# Patient Record
Sex: MALE | Race: BLACK OR AFRICAN AMERICAN | Employment: FULL TIME | URBAN - METROPOLITAN AREA
[De-identification: names, ages, dates, MRNs, and addresses within clinical notes are randomized per-mention and may not be internally consistent; named-entity substitution may affect disease eponyms.]

---

## 2018-07-17 ENCOUNTER — OFFICE VISIT (OUTPATIENT)
Dept: INTERNAL MEDICINE CLINIC | Age: 58
End: 2018-07-17

## 2018-07-17 VITALS
HEART RATE: 77 BPM | WEIGHT: 232.1 LBS | SYSTOLIC BLOOD PRESSURE: 166 MMHG | TEMPERATURE: 97.9 F | DIASTOLIC BLOOD PRESSURE: 93 MMHG | BODY MASS INDEX: 34.38 KG/M2 | OXYGEN SATURATION: 96 % | RESPIRATION RATE: 18 BRPM | HEIGHT: 69 IN

## 2018-07-17 DIAGNOSIS — I10 ESSENTIAL HYPERTENSION: Primary | ICD-10-CM

## 2018-07-17 DIAGNOSIS — N52.9 ERECTILE DYSFUNCTION, UNSPECIFIED ERECTILE DYSFUNCTION TYPE: ICD-10-CM

## 2018-07-17 RX ORDER — ASPIRIN 81 MG/1
TABLET ORAL DAILY
COMMUNITY

## 2018-07-17 RX ORDER — HYDROCHLOROTHIAZIDE 25 MG/1
TABLET ORAL
Refills: 99 | COMMUNITY
Start: 2018-06-23 | End: 2018-07-17 | Stop reason: DRUGHIGH

## 2018-07-17 RX ORDER — SILDENAFIL 100 MG/1
100 TABLET, FILM COATED ORAL AS NEEDED
Qty: 10 TAB | Refills: 11 | Status: SHIPPED | OUTPATIENT
Start: 2018-07-17 | End: 2018-12-31

## 2018-07-17 RX ORDER — LISINOPRIL 10 MG/1
TABLET ORAL
Refills: 99 | COMMUNITY
Start: 2018-06-23 | End: 2018-07-17 | Stop reason: CLARIF

## 2018-07-17 RX ORDER — LISINOPRIL AND HYDROCHLOROTHIAZIDE 12.5; 2 MG/1; MG/1
1 TABLET ORAL DAILY
Qty: 90 TAB | Refills: 11 | Status: SHIPPED | OUTPATIENT
Start: 2018-07-17 | End: 2019-07-05 | Stop reason: SDUPTHER

## 2018-07-17 RX ORDER — AMLODIPINE BESYLATE 10 MG/1
TABLET ORAL
Refills: 99 | COMMUNITY
Start: 2018-06-23 | End: 2018-09-13 | Stop reason: SDUPTHER

## 2018-07-17 NOTE — MR AVS SNAPSHOT
[address] [place] [address] [facility] [address] [phone] Patient: Reji Daly MRN: [id number] OPY:63/32/3545 Visit Information Date & Time Provider Department Dept. Phone Encounter #  
 7/17/2018 10:00 AM Juan Chung [facility] [id number] Follow-up Instructions Return in about 2 weeks (around 7/31/2018) for bp check. Follow-up and Disposition History Upcoming Health Maintenance Date Due Hepatitis C Screening 1960 DTaP/Tdap/Td series (1 - Tdap) 11/16/1979 FOBT Q 1 YEAR AGE 50-75 11/16/2008 Influenza Age 5 to Adult 8/1/2018 Allergies as of 7/17/2018  Review Complete On: 7/17/2018 By: Millie Solis MD  
 Not on File Current Immunizations  Never Reviewed No immunizations on file. Not reviewed this visit You Were Diagnosed With   
  
 Codes Comments Essential hypertension    -  Primary ICD-10-CM: I10 
ICD-9-CM: 401.9 Erectile dysfunction, unspecified erectile dysfunction type     ICD-10-CM: N52.9 ICD-9-CM: 607.84 Vitals BP Pulse Temp Resp Height(growth percentile) Weight(growth percentile) (!) 166/93 77 97.9 °F (36.6 °C) (Oral) 18 5' 9\" (1.753 m) 232 lb 1.6 oz (105.3 kg) SpO2 BMI Smoking Status 96% 34.28 kg/m2 Never Smoker Vitals History BMI and BSA Data Body Mass Index Body Surface Area  
 34.28 kg/m 2 2.26 m 2 Preferred Pharmacy Pharmacy Name Phone [id number] [place], [place] - [address] [place] [address] AT [address] [place] [address] [phone] Your Updated Medication List  
  
   
This list is accurate as of 7/17/18 11:58 AM.  Always use your most recent med list. amLODIPine 10 mg tablet Commonly known as:  Austen Ramirez TAKE 1 TABLET BY MOUTH EVERY DAY  
  
 aspirin delayed-release 81 mg tablet Take  by mouth daily. lisinopril-hydroCHLOROthiazide 20-12.5 mg per tablet Commonly known as:  L[place]ton Decent Take 1 Tab by mouth daily. sildenafil citrate 100 mg tablet Commonly known as:  VIAGRA Take 1 Tab by mouth as needed. Prescriptions Sent to Pharmacy Refills  
 lisinopril-hydroCHLOROthiazide (PRINZIDE, ZESTORETIC) 20-12.5 mg per tablet 11 Sig: Take 1 Tab by mouth daily. Class: Normal  
 Pharmacy: [facility]/pharmacy #[id number] AT [address] [place] [address] Ph #: [phone] Route: Oral  
 sildenafil citrate (VIAGRA) 100 mg tablet 11 Sig: Take 1 Tab by mouth as needed. Class: Normal  
 Pharmacy: [facility]/pharmacy #[id number] AT [address] [place] [address] Ph #: [phone] Route: Oral  
  
We Performed the Following AMB POC EKG ROUTINE W/ 12 LEADS, INTER & REP [94421 CPT(R)] CBC WITH AUTOMATED DIFF [69993 CPT(R)] COLLECTION VENOUS BLOOD,VENIPUNCTURE [id number] CPT(R)] HEMOGLOBIN A1C WITH EAG [37027 CPT(R)] LIPID PANEL [90297 CPT(R)] METABOLIC PANEL, COMPREHENSIVE [15421 CPT(R)] PSA, DIAGNOSTIC (PROSTATE SPECIFIC AG) O040873 CPT(R)] URINALYSIS W/ RFLX MICROSCOPIC [86581 CPT(R)] Follow-up Instructions Return in about 2 weeks (around 7/31/2018) for bp check. Patient Instructions Body Mass Index: Care Instructions Your Care Instructions Body mass index (BMI) can help you see if your weight is raising your risk for health problems. It uses a formula to compare how much you weigh with how tall you are. · A BMI lower than 18.5 is considered underweight. · A BMI between 18.5 and 24.9 is considered healthy. · A BMI between 25 and 29.9 is considered overweight. A BMI of 30 or higher is considered obese. If your BMI is in the normal range, it means that you have a lower risk for weight-related health problems.  If your BMI is in the overweight or obese range, you may be at increased risk for weight-related health problems, such as high blood pressure, heart disease, stroke, arthritis or joint pain, and diabetes. If your BMI is in the underweight range, you may be at increased risk for health problems such as fatigue, lower protection (immunity) against illness, muscle loss, bone loss, hair loss, and hormone problems. BMI is just one measure of your risk for weight-related health problems. You may be at higher risk for health problems if you are not active, you eat an unhealthy diet, or you drink too much alcohol or use tobacco products. Follow-up care is a key part of your treatment and safety. Be sure to make and go to all appointments, and call your doctor if you are having problems. It's also a good idea to know your test results and keep a list of the medicines you take. How can you care for yourself at home? · Practice healthy eating habits. This includes eating plenty of fruits, vegetables, whole grains, lean protein, and low-fat dairy. · If your doctor recommends it, get more exercise. Walking is a good choice. Bit by bit, increase the amount you walk every day. Try for at least 30 minutes on most days of the week. · Do not smoke. Smoking can increase your risk for health problems. If you need help quitting, talk to your doctor about stop-smoking programs and medicines. These can increase your chances of quitting for good. · Limit alcohol to 2 drinks a day for men and 1 drink a day for women. Too much alcohol can cause health problems. If you have a BMI higher than 25 · Your doctor may do other tests to check your risk for weight-related health problems. This may include measuring the distance around your waist. A waist measurement of more than 40 inches in men or 35 inches in women can increase the risk of weight-related health problems.  
· Talk with your doctor about steps you can take to stay healthy or improve your health. You may need to make lifestyle changes to lose weight and stay healthy, such as changing your diet and getting regular exercise. If you have a BMI lower than 18.5 · Your doctor may do other tests to check your risk for health problems. · Talk with your doctor about steps you can take to stay healthy or improve your health. You may need to make lifestyle changes to gain or maintain weight and stay healthy, such as getting more healthy foods in your diet and doing exercises to build muscle. Where can you learn more? Go to http://sandip-veronica.info/. Enter S176 in the search box to learn more about \"Body Mass Index: Care Instructions. \" Current as of: October 13, 2016 Content Version: 11.4 © 6700-7750 [employer]. Care instructions adapted under license by [employer] (which disclaims liability or warranty for this information). If you have questions about a medical condition or this instruction, always ask your healthcare professional. [address] any warranty or liability for your use of this information. Introducing \[facility] [place] [facility]\"" & HEALTH SERVICES! Romayne Duster introduces [employer] patient portal. Now you can access parts of your medical record, email your doctor's office, and request medication refills online. 1. In your internet browser, go to [email or web address] 2. Click on the First Time User? Click Here link in the Sign In box. You will see the New Member Sign Up page. 3. Enter your [employer] Access Code exactly as it appears below. You will not need to use this code after youve completed the sign-up process. If you do not sign up before the expiration date, you must request a new code. · [employer] Access Code: [email or web address] Expires: 10/15/2018 10:28 AM 
 
4. Enter the last four digits of your Social Security Number (xxxx) and Date of Birth (mm/dd/yyyy) as indicated and click Submit.  You will be taken to the next sign-up page. 5. Create a [employer] ID. This will be your [employer] login ID and cannot be changed, so think of one that is secure and easy to remember. 6. Create a [employer] password. You can change your password at any time. 7. Enter your Password Reset Question and Answer. This can be used at a later time if you forget your password. 8. Enter your e-mail address. You will receive e-mail notification when new information is available in [address]. 9. Click Sign Up. You can now view and download portions of your medical record. 10. Click the Download Summary menu link to download a portable copy of your medical information. If you have questions, please visit the Frequently Asked Questions section of the [employer] website. Remember, [employer] is NOT to be used for urgent needs. For medical emergencies, dial 911. Now available from your iPhone and Android! Please provide this summary of care documentation to your next provider. Your primary care clinician is listed as Amy Tejeda. If you have any questions after today's visit, please call [phone].

## 2018-07-17 NOTE — PATIENT INSTRUCTIONS
Body Mass Index: Care Instructions Your Care Instructions Body mass index (BMI) can help you see if your weight is raising your risk for health problems. It uses a formula to compare how much you weigh with how tall you are. · A BMI lower than 18.5 is considered underweight. · A BMI between 18.5 and 24.9 is considered healthy. · A BMI between 25 and 29.9 is considered overweight. A BMI of 30 or higher is considered obese. If your BMI is in the normal range, it means that you have a lower risk for weight-related health problems. If your BMI is in the overweight or obese range, you may be at increased risk for weight-related health problems, such as high blood pressure, heart disease, stroke, arthritis or joint pain, and diabetes. If your BMI is in the underweight range, you may be at increased risk for health problems such as fatigue, lower protection (immunity) against illness, muscle loss, bone loss, hair loss, and hormone problems. BMI is just one measure of your risk for weight-related health problems. You may be at higher risk for health problems if you are not active, you eat an unhealthy diet, or you drink too much alcohol or use tobacco products. Follow-up care is a key part of your treatment and safety. Be sure to make and go to all appointments, and call your doctor if you are having problems. It's also a good idea to know your test results and keep a list of the medicines you take. How can you care for yourself at home? · Practice healthy eating habits. This includes eating plenty of fruits, vegetables, whole grains, lean protein, and low-fat dairy. · If your doctor recommends it, get more exercise. Walking is a good choice. Bit by bit, increase the amount you walk every day. Try for at least 30 minutes on most days of the week. · Do not smoke. Smoking can increase your risk for health problems. If you need help quitting, talk to your doctor about stop-smoking programs and medicines. These can increase your chances of quitting for good. · Limit alcohol to 2 drinks a day for men and 1 drink a day for women. Too much alcohol can cause health problems. If you have a BMI higher than 25 · Your doctor may do other tests to check your risk for weight-related health problems. This may include measuring the distance around your waist. A waist measurement of more than 40 inches in men or 35 inches in women can increase the risk of weight-related health problems. · Talk with your doctor about steps you can take to stay healthy or improve your health. You may need to make lifestyle changes to lose weight and stay healthy, such as changing your diet and getting regular exercise. If you have a BMI lower than 18.5 · Your doctor may do other tests to check your risk for health problems. · Talk with your doctor about steps you can take to stay healthy or improve your health. You may need to make lifestyle changes to gain or maintain weight and stay healthy, such as getting more healthy foods in your diet and doing exercises to build muscle. Where can you learn more? Go to http://sandip-veronica.info/. Enter S176 in the search box to learn more about \"Body Mass Index: Care Instructions. \" Current as of: October 13, 2016 Content Version: 11.4 © 1929-7026 [employer]. Care instructions adapted under license by [employer] (which disclaims liability or warranty for this information). If you have questions about a medical condition or this instruction, always ask your healthcare professional. [address] any warranty or liability for your use of this information.

## 2018-07-17 NOTE — PROGRESS NOTES
SPORTS MEDICINE [facility]  Rogelio Means MD, [address] [address]  Phone:  [phone]  Fax: [phone]    Chief Complaint   Patient presents with    Establish Care       SUBJECTIVE:    Xavier Garza is a 59 y.o. male Patient comes in today with a known history of hypertension and is seen for evaluation as a new patient. Patient comes in today complaining of a few week history of a numbness of the ulnar distribution of fingers on the left hand. He's a HAZMAT . Other new complaints denied and patient is seen for evaluation. He's had blood pressure problems for the past 8 1/2 years. Current Outpatient Prescriptions   Medication Sig Dispense Refill    amLODIPine (NORVASC) 10 mg tablet TAKE 1 TABLET BY MOUTH EVERY DAY  99    aspirin delayed-release 81 mg tablet Take  by mouth daily.  lisinopril-hydroCHLOROthiazide (PRINZIDE, ZESTORETIC) 20-12.5 mg per tablet Take 1 Tab by mouth daily. 90 Tab 11    sildenafil citrate (VIAGRA) 100 mg tablet Take 1 Tab by mouth as needed. 10 Tab 11     Past Medical History:   Diagnosis Date    Erectile dysfunction     Hypertension      History reviewed. No pertinent surgical history.   Not on File    REVIEW OF SYSTEMS:  General: negative for - chills or fever  ENT: negative for - headaches, nasal congestion or tinnitus  Respiratory: negative for - cough, hemoptysis, shortness of breath or wheezing  Cardiovascular : negative for - chest pain, edema, palpitations or shortness of breath  Gastrointestinal: negative for - abdominal pain, blood in stools, heartburn or nausea/vomiting  Genito-Urinary: no dysuria, trouble voiding, or hematuria  Musculoskeletal: negative for - gait disturbance, joint pain, joint stiffness or joint swelling  Neurological: no TIA or stroke symptoms  Hematologic: no bruises, no bleeding, no swollen glands  Integument: no lumps, mole changes, nail changes or rash  Endocrine:no malaise/lethargy or unexpected weight changes      Social History     Social History    Marital status: N/A     Spouse name: N/A    Number of children: N/A    Years of education: N/A     Social History Main Topics    Smoking status: Never Smoker    Smokeless tobacco: Never Used    Alcohol use Yes      Comment: occasional    Drug use: No    Sexual activity: Yes     Partners: Female     Birth control/ protection: None     Other Topics Concern    None     Social History Narrative    None     Family History   Problem Relation Age of Onset    Diabetes Mother      Habits:  Nonsmoker, non drug abuser. Drinks a couple cans of beer during the week. Social History:  The patient is . He is the father of two daughters ages 28 and 35, and a son age 25, and two grandchildren. He completed high school and lives with his girlfriend. Jehovah's witness background is [facility].    Family History:  Father  in his late 25s as he was killed. Mother  67 with Alzheimer's dementia. One brother, two sisters are alive and well. One sister had stroke at the age of 48. OBJECTIVE:     Visit Vitals    BP (!) 166/93    Pulse 77    Temp 97.9 °F (36.6 °C) (Oral)    Resp 18    Ht 5' 9\" (1.753 m)    Wt 232 lb 1.6 oz (105.3 kg)    SpO2 96%    BMI 34.28 kg/m2     CONSTITUTIONAL: well , well nourished, appears age appropriate  EYES: perrla, eom intact  ENMT:moist mucous membranes, pharynx clear  NECK: supple. Thyroid normal  RESPIRATORY: Chest: clear bilaterally  CARDIOVASCULAR: Heart: regular rate and rhythm  GASTROINTESTINAL: Abdomen: soft, bowel sounds active  HEMATOLOGIC: no pathological lymph nodes palpated  MUSCULOSKELETAL: Extremities: no edema, pulse 1+   INTEGUMENT: No unusual rashes or suspicious skin lesions noted. Nails appear normal.  NEUROLOGIC: non-focal exam   MENTAL STATUS: alert and oriented, appropriate affect     No results found for any previous visit. ASSESSMENT:   1. Essential hypertension    2. Erectile dysfunction, unspecified erectile dysfunction type      Patient's medical status is stable. We need to do some tweaking, however. His blood pressure control is less than ideal.  We advise him we'd like his blood pressure 130/80 or less. To that end will stop Hydrochlorothiazide and Lisinopril and place him on Lisinopril/Hydrochlorothiazide 20/12.5 q.daily. He'll continue Amlodipine daily. He'll come back in two weeks for blood pressure check and we'll see him every two weeks until his blood pressure is 130/80 or less. We ask him to check his blood pressure once or twice between these blood pressure checks to be certain we've not dropped his blood pressure too low. If his blood pressure remains in the hypertensive range then we'll add a beta blocker, either Bystolic or Metoprolol, to bring his blood pressure down to normal.  He'll continue aspirin every day. We ask him to get towards his ideal body weight and to that end we encourage a heart healthy, weight reducing diet. For his ED we'll try Viagra and see if we can get his function back to normal.  He'll return to the office to see me in six months. Discussed the patient's BMI with him. The BMI follow up plan is as follows:     dietary management education, guidance, and counseling  encourage exercise  monitor weight  prescribed dietary intake    An After Visit Summary was printed and given to the patient. I have discussed the diagnosis with the patient and the intended plan as seen in the  orders above. The patient understands and agees with the plan. The patient has   received an after visit summary and questions were answered concerning  future plans  Patient labs and/or xrays were reviewed  Past records were reviewed.     PLAN:  .  Orders Placed This Encounter    URINALYSIS W/ RFLX MICROSCOPIC    CBC WITH AUTOMATED DIFF    METABOLIC PANEL, COMPREHENSIVE    LIPID PANEL    PROSTATE SPECIFIC AG    HEMOGLOBIN A1C WITH EAG    AMB POC EKG ROUTINE W/ 12 LEADS, INTER & REP    amLODIPine (NORVASC) 10 mg tablet    DISCONTD: hydroCHLOROthiazide (HYDRODIURIL) 25 mg tablet    DISCONTD: lisinopril (PRINIVIL, ZESTRIL) 10 mg tablet    aspirin delayed-release 81 mg tablet    lisinopril-hydroCHLOROthiazide (PRINZIDE, ZESTORETIC) 20-12.5 mg per tablet    sildenafil citrate (VIAGRA) 100 mg tablet       Follow-up Disposition:  Return in about 2 weeks (around 7/31/2018) for bp check. ATTENTION:   This medical record was transcribed using an electronic medical records system. Although proofread, it may and can contain electronic and spelling errors. Other human spelling and other errors may be present. Corrections may be executed at a later time. Please feel free to contact us for any clarifications as needed.

## 2018-07-17 NOTE — PROGRESS NOTES
1. Have you been to the ER, urgent care clinic since your last visit? Hospitalized since your last visit? No    2. Have you seen or consulted any other health care providers outside of the [facility] since your last visit? Include any pap smears or colon screening.  No    Complaints of numbness in 2 fingers on left hand

## 2018-07-18 LAB
ALBUMIN SERPL-MCNC: 4.7 G/DL (ref 3.5–5.5)
ALBUMIN/GLOB SERPL: 1.7 {RATIO} (ref 1.2–2.2)
ALP SERPL-CCNC: 50 IU/L (ref 39–117)
ALT SERPL-CCNC: 22 IU/L (ref 0–44)
APPEARANCE UR: CLEAR
AST SERPL-CCNC: 27 IU/L (ref 0–40)
BASOPHILS # BLD AUTO: 0 X10E3/UL (ref 0–0.2)
BASOPHILS NFR BLD AUTO: 1 %
BILIRUB SERPL-MCNC: 1.1 MG/DL (ref 0–1.2)
BILIRUB UR QL STRIP: NEGATIVE
BUN SERPL-MCNC: 12 MG/DL (ref 6–24)
BUN/CREAT SERPL: 13 (ref 9–20)
CALCIUM SERPL-MCNC: 9.7 MG/DL (ref 8.7–10.2)
CHLORIDE SERPL-SCNC: 99 MMOL/L (ref 96–106)
CHOLEST SERPL-MCNC: 176 MG/DL (ref 100–199)
CO2 SERPL-SCNC: 22 MMOL/L (ref 20–29)
COLOR UR: YELLOW
CREAT SERPL-MCNC: 0.96 MG/DL (ref 0.76–1.27)
EOSINOPHIL # BLD AUTO: 0.1 X10E3/UL (ref 0–0.4)
EOSINOPHIL NFR BLD AUTO: 3 %
ERYTHROCYTE [DISTWIDTH] IN BLOOD BY AUTOMATED COUNT: 15.2 % (ref 12.3–15.4)
EST. AVERAGE GLUCOSE BLD GHB EST-MCNC: 123 MG/DL
GLOBULIN SER CALC-MCNC: 2.8 G/DL (ref 1.5–4.5)
GLUCOSE SERPL-MCNC: 110 MG/DL (ref 65–99)
GLUCOSE UR QL: NEGATIVE
HBA1C MFR BLD: 5.9 % (ref 4.8–5.6)
HCT VFR BLD AUTO: 45.3 % (ref 37.5–51)
HDLC SERPL-MCNC: 71 MG/DL
HGB BLD-MCNC: 15.6 G/DL (ref 13–17.7)
HGB UR QL STRIP: NEGATIVE
IMM GRANULOCYTES # BLD: 0 X10E3/UL (ref 0–0.1)
IMM GRANULOCYTES NFR BLD: 1 %
KETONES UR QL STRIP: NEGATIVE
LDLC SERPL CALC-MCNC: 94 MG/DL (ref 0–99)
LEUKOCYTE ESTERASE UR QL STRIP: NEGATIVE
LYMPHOCYTES # BLD AUTO: 1.7 X10E3/UL (ref 0.7–3.1)
LYMPHOCYTES NFR BLD AUTO: 39 %
MCH RBC QN AUTO: 31 PG (ref 26.6–33)
MCHC RBC AUTO-ENTMCNC: 34.4 G/DL (ref 31.5–35.7)
MCV RBC AUTO: 90 FL (ref 79–97)
MICRO URNS: NORMAL
MONOCYTES # BLD AUTO: 0.5 X10E3/UL (ref 0.1–0.9)
MONOCYTES NFR BLD AUTO: 12 %
NEUTROPHILS # BLD AUTO: 2 X10E3/UL (ref 1.4–7)
NEUTROPHILS NFR BLD AUTO: 44 %
NITRITE UR QL STRIP: NEGATIVE
PH UR STRIP: 6.5 [PH] (ref 5–7.5)
PLATELET # BLD AUTO: 207 X10E3/UL (ref 150–379)
POTASSIUM SERPL-SCNC: 3.8 MMOL/L (ref 3.5–5.2)
PROT SERPL-MCNC: 7.5 G/DL (ref 6–8.5)
PROT UR QL STRIP: NEGATIVE
PSA SERPL-MCNC: 1.1 NG/ML (ref 0–4)
RBC # BLD AUTO: 5.03 X10E6/UL (ref 4.14–5.8)
SODIUM SERPL-SCNC: 139 MMOL/L (ref 134–144)
SP GR UR: 1.01 (ref 1–1.03)
TRIGL SERPL-MCNC: 57 MG/DL (ref 0–149)
UROBILINOGEN UR STRIP-MCNC: 0.2 MG/DL (ref 0.2–1)
VLDLC SERPL CALC-MCNC: 11 MG/DL (ref 5–40)
WBC # BLD AUTO: 4.4 X10E3/UL (ref 3.4–10.8)

## 2018-09-13 RX ORDER — AMLODIPINE BESYLATE 10 MG/1
TABLET ORAL
Qty: 90 TAB | Refills: 7 | Status: SHIPPED | OUTPATIENT
Start: 2018-09-13 | End: 2019-09-19 | Stop reason: SDUPTHER

## 2018-12-31 ENCOUNTER — OFFICE VISIT (OUTPATIENT)
Dept: INTERNAL MEDICINE CLINIC | Age: 58
End: 2018-12-31

## 2018-12-31 VITALS
RESPIRATION RATE: 20 BRPM | HEART RATE: 94 BPM | BODY MASS INDEX: 36.56 KG/M2 | HEIGHT: 69 IN | TEMPERATURE: 97.9 F | WEIGHT: 246.8 LBS | DIASTOLIC BLOOD PRESSURE: 94 MMHG | OXYGEN SATURATION: 95 % | SYSTOLIC BLOOD PRESSURE: 148 MMHG

## 2018-12-31 DIAGNOSIS — I10 ESSENTIAL HYPERTENSION: ICD-10-CM

## 2018-12-31 DIAGNOSIS — E66.01 SEVERE OBESITY (HCC): Primary | ICD-10-CM

## 2018-12-31 DIAGNOSIS — N52.9 ERECTILE DYSFUNCTION, UNSPECIFIED ERECTILE DYSFUNCTION TYPE: ICD-10-CM

## 2018-12-31 RX ORDER — VARDENAFIL HYDROCHLORIDE 20 MG/1
20 TABLET ORAL AS NEEDED
Qty: 10 TAB | Refills: 11 | Status: SHIPPED | OUTPATIENT
Start: 2018-12-31 | End: 2019-11-16 | Stop reason: SDUPTHER

## 2018-12-31 NOTE — PROGRESS NOTES
SPORTS MEDICINE AND PRIMARY CARE Chaz Hutchison MD, [address] [address] Phone:  [phone]  Fax: [phone] Chief Complaint Patient presents with  Foot Swelling SUBECTIVE: 
 
Pura Cates is a 59 y.o. male Patient returns today with known history of morbid obesity, primary hypertension, erectile dysfunction, and is seen for evaluation. Patient returns today with several complaints. His feet and hands are swelling. He complains of erectile dysfunction. Viagra was not successful. He does not particularly like his job, but is gainfully employed as a  for [employer]. Patient is seen for evaluation. He recently had an ophthalmological evaluation, which he states was normal. 
 
 
 
Current Outpatient Medications Medication Sig Dispense Refill  vardenafil (LEVITRA) 20 mg tablet Take 20 mg by mouth as needed. 10 Tab 11  
 amLODIPine (NORVASC) 10 mg tablet TAKE 1 TABLET BY MOUTH EVERY DAY 90 Tab 7  
 aspirin delayed-release 81 mg tablet Take  by mouth daily.  lisinopril-hydroCHLOROthiazide (PRINZIDE, ZESTORETIC) 20-12.5 mg per tablet Take 1 Tab by mouth daily. 90 Tab 11 Past Medical History:  
Diagnosis Date  Erectile dysfunction  Hypertension History reviewed. No pertinent surgical history. Not on File REVIEW OF SYSTEMS: 
 No chest pain, no shortness of breath. Social History Socioeconomic History  Marital status: UNKNOWN Spouse name: Not on file  Number of children: Not on file  Years of education: Not on file  Highest education level: Not on file Tobacco Use  Smoking status: Never Smoker  Smokeless tobacco: Never Used Substance and Sexual Activity  Alcohol use: Yes Comment: occasional  
 Drug use: No  
 Sexual activity: Yes  
  Partners: Female Birth control/protection: None  
r Family History Problem Relation Age of Onset  Diabetes Mother OBJECTIVE: 
 Visit Vitals BP (!) 148/94 Pulse 94 Temp 97.9 °F (36.6 °C) (Oral) Resp 20 Ht 5' 9\" (1.753 m) Wt 246 lb 12.8 oz (111.9 kg) SpO2 95% BMI 36.45 kg/m² ENT: perrla,  eom intact NECK: supple. Thyroid normal 
CHEST: clear to ascultation and percussion HEART: regular rate and rhythm ABD: soft, bowel sounds active EXTREMITIES: no edema, pulse 1+ No visits with results within 3 Month(s) from this visit. Latest known visit with results is:  
Office Visit on 07/17/2018 Component Date Value Ref Range Status  Specific Gravity 07/17/2018 1.013  1.005 - 1.030 Final  
 pH (UA) 07/17/2018 6.5  5.0 - 7.5 Final  
 Color 07/17/2018 Yellow  Yellow Final  
 Appearance 07/17/2018 Clear  Clear Final  
 Leukocyte Esterase 07/17/2018 Negative  Negative Final  
 Protein 07/17/2018 Negative  Negative/Trace Final  
 Glucose 07/17/2018 Negative  Negative Final  
 Ketone 07/17/2018 Negative  Negative Final  
 Blood 07/17/2018 Negative  Negative Final  
 Bilirubin 07/17/2018 Negative  Negative Final  
 Urobilinogen 07/17/2018 0.2  0.2 - 1.0 mg/dL Final  
 Nitrites 07/17/2018 Negative  Negative Final  
 Microscopic Examination 07/17/2018 Comment   Final  
 Microscopic not indicated and not performed.  WBC 07/17/2018 4.4  3.4 - 10.8 x10E3/uL Final  
 RBC 07/17/2018 5.03  4. 14 - 5.80 x10E6/uL Final  
 HGB 07/17/2018 15.6  13.0 - 17.7 g/dL Final  
 HCT 07/17/2018 45.3  37.5 - 51.0 % Final  
 MCV 07/17/2018 90  79 - 97 fL Final  
 MCH 07/17/2018 31.0  26.6 - 33.0 pg Final  
 MCHC 07/17/2018 34.4  31.5 - 35.7 g/dL Final  
 RDW 07/17/2018 15.2  12.3 - 15.4 % Final  
 PLATELET 36/11/0887 976  150 - 379 x10E3/uL Final  
 NEUTROPHILS 07/17/2018 44  Not Estab. % Final  
 Lymphocytes 07/17/2018 39  Not Estab. % Final  
 MONOCYTES 07/17/2018 12  Not Estab. % Final  
 EOSINOPHILS 07/17/2018 3  Not Estab. % Final  
 BASOPHILS 07/17/2018 1  Not Estab. % Final  
  ABS. NEUTROPHILS 07/17/2018 2.0  1.4 - 7.0 x10E3/uL Final  
 Abs Lymphocytes 07/17/2018 1.7  0.7 - 3.1 x10E3/uL Final  
 ABS. MONOCYTES 07/17/2018 0.5  0.1 - 0.9 x10E3/uL Final  
 ABS. EOSINOPHILS 07/17/2018 0.1  0.0 - 0.4 x10E3/uL Final  
 ABS. BASOPHILS 07/17/2018 0.0  0.0 - 0.2 x10E3/uL Final  
 IMMATURE GRANULOCYTES 07/17/2018 1  Not Estab. % Final  
 ABS. IMM. GRANS. 07/17/2018 0.0  0.0 - 0.1 x10E3/uL Final  
 Glucose 07/17/2018 110* 65 - 99 mg/dL Final  
 BUN 07/17/2018 12  6 - 24 mg/dL Final  
 Creatinine 07/17/2018 0.96  0.76 - 1.27 mg/dL Final  
 GFR est non-AA 07/17/2018 86  >59 mL/min/1.73 Final  
 GFR est AA 07/17/2018 100  >59 mL/min/1.73 Final  
 BUN/Creatinine ratio 07/17/2018 13  9 - 20 Final  
 Sodium 07/17/2018 139  134 - 144 mmol/L Final  
 Potassium 07/17/2018 3.8  3.5 - 5.2 mmol/L Final  
 Chloride 07/17/2018 99  96 - 106 mmol/L Final  
 CO2 07/17/2018 22  20 - 29 mmol/L Final  
 Calcium 07/17/2018 9.7  8.7 - 10.2 mg/dL Final  
 Protein, total 07/17/2018 7.5  6.0 - 8.5 g/dL Final  
 Albumin 07/17/2018 4.7  3.5 - 5.5 g/dL Final  
 GLOBULIN, TOTAL 07/17/2018 2.8  1.5 - 4.5 g/dL Final  
 A-G Ratio 07/17/2018 1.7  1.2 - 2.2 Final  
 Bilirubin, total 07/17/2018 1.1  0.0 - 1.2 mg/dL Final  
 Alk. phosphatase 07/17/2018 50  39 - 117 IU/L Final  
 AST (SGOT) 07/17/2018 27  0 - 40 IU/L Final  
 ALT (SGPT) 07/17/2018 22  0 - 44 IU/L Final  
 Cholesterol, total 07/17/2018 176  100 - 199 mg/dL Final  
 Triglyceride 07/17/2018 57  0 - 149 mg/dL Final  
 HDL Cholesterol 07/17/2018 71  >39 mg/dL Final  
 VLDL, calculated 07/17/2018 11  5 - 40 mg/dL Final  
 LDL, calculated 07/17/2018 94  0 - 99 mg/dL Final  
 Prostate Specific Ag 07/17/2018 1.1  0.0 - 4.0 ng/mL Final  
 Comment: Roche ECLIA methodology.  
According to the American Urological Association, Serum PSA should 
decrease and remain at undetectable levels after radical 
 prostatectomy. The AUA defines biochemical recurrence as an initial 
PSA value 0.2 ng/mL or greater followed by a subsequent confirmatory PSA value 0.2 ng/mL or greater. Values obtained with different assay methods or kits cannot be used 
interchangeably. Results cannot be interpreted as absolute evidence 
of the presence or absence of malignant disease.  Hemoglobin A1c 07/17/2018 5.9* 4.8 - 5.6 % Final  
 Comment:          Pre-diabetes: 5.7 - 6.4 Diabetes: >6.4 Glycemic control for adults with diabetes: <7.0  Estimated average glucose 07/17/2018 123  mg/dL Final  
 
  
 
ASSESSMENT: 
1. Severe obesity ([address] 2. Erectile dysfunction, unspecified erectile dysfunction type 3. Essential hypertension Patient's medical status is stable. Repeat blood pressure is 132/84 and no adjustment in medication will be made. I think salt is the contributing factor for the swelling and we suggest he cut back on salty foods. He is agreeable to a colonoscopy repeat. He had one about ten years ago and we refer him to GI. We will also check hepatitis C profile because of his age group. He has obesity and we encouraged physical activity 30 minutes five days a week and a heart healthy, weight reducing diet. We suggest he come back in about six months, sooner if he has any problems. He is advised he can walk in to see us any time should he have an issue and unable to get an appointment. I have discussed the diagnosis with the patient and the intended plan as seen in the 
orders above. The patient understands and agees with the plan. The patient has  
received an after visit summary and questions were answered concerning 
future plans Patient labs and/or xrays were reviewed Past records were reviewed. PLAN: 
. Orders Placed This Encounter  HEPATITIS C AB  
 REFERRAL FOR COLONOSCOPY  vardenafil (LEVITRA) 20 mg tablet Follow-up Disposition: Return in about 6 months (around 6/30/2019). ATTENTION:  
This medical record was transcribed using an electronic medical records system. Although proofread, it may and can contain electronic and spelling errors. Other human spelling and other errors may be present. Corrections may be executed at a later time. Please feel free to contact us for any clarifications as needed.

## 2018-12-31 NOTE — PROGRESS NOTES
1. Have you been to the ER, urgent care clinic since your last visit? Hospitalized since your last visit? No 
 
2. Have you seen or consulted any other health care providers outside of the [address] [place] [address] since your last visit? Include any pap smears or colon screening. No  
 
complains of 
Hand and foot swelling

## 2019-01-01 LAB — HCV AB S/CO SERPL IA: <0.1 S/CO RATIO (ref 0–0.9)

## 2019-02-27 PROBLEM — Z98.890 S/P COLONOSCOPY: Status: ACTIVE | Noted: 2019-02-27

## 2019-06-05 ENCOUNTER — TELEPHONE (OUTPATIENT)
Dept: INTERNAL MEDICINE CLINIC | Age: 59
End: 2019-06-05

## 2019-06-05 NOTE — TELEPHONE ENCOUNTER
Patient wife called asking that his visit on 3/18/19 be resubmitted to insurance. Her number is [phone].

## 2019-07-05 ENCOUNTER — OFFICE VISIT (OUTPATIENT)
Dept: INTERNAL MEDICINE CLINIC | Age: 59
End: 2019-07-05

## 2019-07-05 VITALS
RESPIRATION RATE: 20 BRPM | OXYGEN SATURATION: 95 % | BODY MASS INDEX: 35.77 KG/M2 | TEMPERATURE: 97.9 F | WEIGHT: 241.5 LBS | HEART RATE: 85 BPM | DIASTOLIC BLOOD PRESSURE: 88 MMHG | SYSTOLIC BLOOD PRESSURE: 149 MMHG | HEIGHT: 69 IN

## 2019-07-05 DIAGNOSIS — I10 ESSENTIAL HYPERTENSION: Primary | ICD-10-CM

## 2019-07-05 DIAGNOSIS — E66.01 SEVERE OBESITY (HCC): ICD-10-CM

## 2019-07-05 DIAGNOSIS — N52.9 ERECTILE DYSFUNCTION, UNSPECIFIED ERECTILE DYSFUNCTION TYPE: ICD-10-CM

## 2019-07-05 PROBLEM — R73.02 IGT (IMPAIRED GLUCOSE TOLERANCE): Status: ACTIVE | Noted: 2018-07-17

## 2019-07-05 RX ORDER — LISINOPRIL AND HYDROCHLOROTHIAZIDE 12.5; 2 MG/1; MG/1
1 TABLET ORAL DAILY
Qty: 90 TAB | Refills: 11 | Status: SHIPPED | OUTPATIENT
Start: 2019-07-05 | End: 2020-10-02

## 2019-07-05 NOTE — PROGRESS NOTES
1. Have you been to the ER, urgent care clinic since your last visit? Hospitalized since your last visit? No    2. Have you seen or consulted any other health care providers outside of the [address] [place] [address] since your last visit? Include any pap smears or colon screening.  No    Wants to discuss low energy

## 2019-07-05 NOTE — PROGRESS NOTES
SPORTS MEDICINE [facility] PRIMARY CARE  Ed Turner MD, [facility] [address] [address]  Phone:  [phone]  Fax: [phone]       Chief Complaint   Patient presents with    Hypertension   . SUBJECTIVE:    Genevieve Mcduffie is a 59 y.o. male The patient returns today with a known history of primary hypertension, obesity, erectile dysfunction and prediabetes, and is seen for evaluation. The patient returns today complaining of severe fatigue. He just does not have the energy that he used to have and wonders why. The patient is seen for evaluation. 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 Current Outpatient Medications   Medication Sig Dispense Refill    vardenafil (LEVITRA) 20 mg tablet Take 20 mg by mouth as needed. 10 Tab 11    amLODIPine (NORVASC) 10 mg tablet TAKE 1 TABLET BY MOUTH EVERY DAY 90 Tab 7    aspirin delayed-release 81 mg tablet Take  by mouth daily.  lisinopril-hydroCHLOROthiazide (PRINZIDE, ZESTORETIC) 20-12.5 mg per tablet Take 1 Tab by mouth daily. 80 Tab 11     Past Medical History:   Diagnosis Date    Erectile dysfunction     Hypertension     IGT (impaired glucose tolerance) 07/17/2018    S/P colonoscopy 02/27/2019    Ermelinda Castañeda - int hemorhoids - repeat 5 yrs     History reviewed. No pertinent surgical history.   Not on File      REVIEW OF SYSTEMS:  General: negative for - chills or fever  ENT: negative for - headaches, nasal congestion or tinnitus  Respiratory: negative for - cough, hemoptysis, shortness of breath or wheezing  Cardiovascular : negative for - chest pain, edema, palpitations or shortness of breath  Gastrointestinal: negative for - abdominal pain, blood in stools, heartburn or nausea/vomiting  Genito-Urinary: no dysuria, trouble voiding, or hematuria  Musculoskeletal: negative for - gait disturbance, joint pain, joint stiffness or joint swelling  Neurological: no TIA or stroke symptoms  Hematologic: no bruises, no bleeding, no swollen glands  Integument: no lumps, mole changes, nail changes or rash  Endocrine: no malaise/lethargy or unexpected weight changes      Social History     Socioeconomic History    Marital status:      Spouse name: Not on file    Number of children: Not on file    Years of education: Not on file    Highest education level: Not on file   Tobacco Use    Smoking status: Never Smoker    Smokeless tobacco: Never Used   Substance and Sexual Activity    Alcohol use: Yes     Comment: occasional    Drug use: No    Sexual activity: Yes     Partners: Female     Birth control/protection: None   Social History Narrative    Habits:  Nonsmoker, non drug abuser. Drinks a couple cans of beer during the week.         Social History:  The patient is . He is the father of two daughters ages 28 and 35, and a son age 25, and two grandchildren. He completed high school and lives with his girlfriend. Nondenominational background is [employer].         Family History:  Father  in his late 25s as he was killed. Mother  67 with Alzheimer's dementia. One brother, two sisters are alive and well. One sister had stroke at the age of 48. Family History   Problem Relation Age of Onset    Diabetes Mother        OBJECTIVE:    Visit Vitals  /88   Pulse 85   Temp 97.9 °F (36.6 °C) (Oral)   Resp 20   Ht 5' 9\" (1.753 m)   Wt 241 lb 8 oz (109.5 kg)   SpO2 95%   BMI 35.66 kg/m²     CONSTITUTIONAL: well , well nourished, appears age appropriate  EYES: perrla, eom intact  ENMT:moist mucous membranes, pharynx clear  NECK: supple. Thyroid normal  RESPIRATORY: Chest: clear bilaterally   CARDIOVASCULAR: Heart: regular rate and rhythm  GASTROINTESTINAL: Abdomen: soft, bowel sounds active  HEMATOLOGIC: no pathological lymph nodes palpated  MUSCULOSKELETAL: Extremities: no edema, pulse 1+   INTEGUMENT: No unusual rashes or suspicious skin lesions noted.  Nails appear normal.  NEUROLOGIC: non-focal exam   MENTAL STATUS: alert and oriented, appropriate affect           ASSESSMENT:  1. Essential hypertension    2. Severe obesity ([address]    3. Erectile dysfunction, unspecified erectile dysfunction type      Examination today is remarkable for obesity, for which he has lost 5 pounds since 12/2018 and we would encourage him to continue his physical activity and a weight reduction program.  In fact, we encouraged him to get into a more active exercise program if he can. Blood pressure elevation is noted. He is on amlodipine 10 mg and lisinopril 20/12. 5. He will come back in a week for a blood pressure check. If it remains greater than 130/80, we will add Bystolic 5 mg at bedtime. If insurance does not pay for Bystolic, we will consider either metoprolol or hydralazine. We encouraged him, therefore, to have his blood pressure checked at least every 4 months or so. He can come by here and have it checked, or he can have it checked at the pharmacy. We will see him formally a year from now. We will report to him the results of the laboratory studies and let him know if there is anything that is abnormal with a phone call. If it is just minor abnormalities, we will just give him the report. I have discussed the diagnosis with the patient and the intended plan as seen in the  orders above. The patient understands and agees with the plan. The patient has   received an after visit summary and questions were answered concerning  future plans  Patient labs and/or xrays were reviewed  Past records were reviewed.     PLAN:  .  Orders Placed This Encounter    URINALYSIS W/ RFLX MICROSCOPIC    CBC WITH AUTOMATED DIFF    METABOLIC PANEL, COMPREHENSIVE    LIPID PANEL    PROSTATE SPECIFIC AG    HEMOGLOBIN A1C WITH EAG    TSH 3RD GENERATION    AMB POC EKG ROUTINE W/ 12 LEADS, INTER & REP       Follow-up and Dispositions    · Return in about 1 week (around 7/12/2019) for bp check. ATTENTION:   This medical record was transcribed using an electronic medical records system. Although proofread, it may and can contain electronic and spelling errors. Other human spelling and other errors may be present. Corrections may be executed at a later time. Please feel free to contact us for any clarifications as needed.

## 2019-07-06 LAB
ALBUMIN SERPL-MCNC: 4.7 G/DL (ref 3.6–4.8)
ALBUMIN/GLOB SERPL: 1.6 {RATIO} (ref 1.2–2.2)
ALP SERPL-CCNC: 48 IU/L (ref 39–117)
ALT SERPL-CCNC: 26 IU/L (ref 0–44)
APPEARANCE UR: CLEAR
AST SERPL-CCNC: 24 IU/L (ref 0–40)
BASOPHILS # BLD AUTO: 0 X10E3/UL (ref 0–0.2)
BASOPHILS NFR BLD AUTO: 0 %
BILIRUB SERPL-MCNC: 0.6 MG/DL (ref 0–1.2)
BILIRUB UR QL STRIP: NEGATIVE
BUN SERPL-MCNC: 20 MG/DL (ref 8–27)
BUN/CREAT SERPL: 18 (ref 10–24)
CALCIUM SERPL-MCNC: 9.7 MG/DL (ref 8.6–10.2)
CHLORIDE SERPL-SCNC: 102 MMOL/L (ref 96–106)
CHOLEST SERPL-MCNC: 191 MG/DL (ref 100–199)
CO2 SERPL-SCNC: 23 MMOL/L (ref 20–29)
COLOR UR: YELLOW
CREAT SERPL-MCNC: 1.14 MG/DL (ref 0.76–1.27)
EOSINOPHIL # BLD AUTO: 0.1 X10E3/UL (ref 0–0.4)
EOSINOPHIL NFR BLD AUTO: 2 %
ERYTHROCYTE [DISTWIDTH] IN BLOOD BY AUTOMATED COUNT: 15.2 % (ref 12.3–15.4)
EST. AVERAGE GLUCOSE BLD GHB EST-MCNC: 126 MG/DL
GLOBULIN SER CALC-MCNC: 2.9 G/DL (ref 1.5–4.5)
GLUCOSE SERPL-MCNC: 93 MG/DL (ref 65–99)
GLUCOSE UR QL: NEGATIVE
HBA1C MFR BLD: 6 % (ref 4.8–5.6)
HCT VFR BLD AUTO: 45.2 % (ref 37.5–51)
HDLC SERPL-MCNC: 63 MG/DL
HGB BLD-MCNC: 15.4 G/DL (ref 13–17.7)
HGB UR QL STRIP: NEGATIVE
IMM GRANULOCYTES # BLD AUTO: 0 X10E3/UL (ref 0–0.1)
IMM GRANULOCYTES NFR BLD AUTO: 0 %
KETONES UR QL STRIP: NEGATIVE
LDLC SERPL CALC-MCNC: 118 MG/DL (ref 0–99)
LEUKOCYTE ESTERASE UR QL STRIP: NEGATIVE
LYMPHOCYTES # BLD AUTO: 1.9 X10E3/UL (ref 0.7–3.1)
LYMPHOCYTES NFR BLD AUTO: 37 %
MCH RBC QN AUTO: 30.7 PG (ref 26.6–33)
MCHC RBC AUTO-ENTMCNC: 34.1 G/DL (ref 31.5–35.7)
MCV RBC AUTO: 90 FL (ref 79–97)
MICRO URNS: NORMAL
MONOCYTES # BLD AUTO: 0.5 X10E3/UL (ref 0.1–0.9)
MONOCYTES NFR BLD AUTO: 9 %
NEUTROPHILS # BLD AUTO: 2.6 X10E3/UL (ref 1.4–7)
NEUTROPHILS NFR BLD AUTO: 52 %
NITRITE UR QL STRIP: NEGATIVE
PH UR STRIP: 5.5 [PH] (ref 5–7.5)
PLATELET # BLD AUTO: 209 X10E3/UL (ref 150–450)
POTASSIUM SERPL-SCNC: 4.7 MMOL/L (ref 3.5–5.2)
PROT SERPL-MCNC: 7.6 G/DL (ref 6–8.5)
PROT UR QL STRIP: NEGATIVE
PSA SERPL-MCNC: 0.9 NG/ML (ref 0–4)
RBC # BLD AUTO: 5.01 X10E6/UL (ref 4.14–5.8)
SODIUM SERPL-SCNC: 139 MMOL/L (ref 134–144)
SP GR UR: 1.02 (ref 1–1.03)
TRIGL SERPL-MCNC: 51 MG/DL (ref 0–149)
TSH SERPL DL<=0.005 MIU/L-ACNC: 2.29 UIU/ML (ref 0.45–4.5)
UROBILINOGEN UR STRIP-MCNC: 0.2 MG/DL (ref 0.2–1)
VLDLC SERPL CALC-MCNC: 10 MG/DL (ref 5–40)
WBC # BLD AUTO: 5.2 X10E3/UL (ref 3.4–10.8)

## 2019-09-19 RX ORDER — AMLODIPINE BESYLATE 10 MG/1
TABLET ORAL
Qty: 90 TAB | Refills: 7 | Status: SHIPPED | OUTPATIENT
Start: 2019-09-19 | End: 2020-09-21

## 2019-10-26 ENCOUNTER — HOSPITAL ENCOUNTER (EMERGENCY)
Age: 59
Discharge: HOME | End: 2019-10-26
Attending: EMERGENCY MEDICINE
Payer: COMMERCIAL

## 2019-10-26 ENCOUNTER — APPOINTMENT (OUTPATIENT)
Dept: GENERAL RADIOLOGY | Age: 59
End: 2019-10-26
Attending: EMERGENCY MEDICINE
Payer: COMMERCIAL

## 2019-10-26 VITALS
BODY MASS INDEX: 34.85 KG/M2 | RESPIRATION RATE: 18 BRPM | DIASTOLIC BLOOD PRESSURE: 79 MMHG | WEIGHT: 236 LBS | SYSTOLIC BLOOD PRESSURE: 128 MMHG | TEMPERATURE: 98.2 F | OXYGEN SATURATION: 96 % | HEART RATE: 98 BPM

## 2019-10-26 DIAGNOSIS — S01.511A LIP LACERATION, INITIAL ENCOUNTER: Primary | ICD-10-CM
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
## 2019-10-26 DIAGNOSIS — I10 ACCELERATED HYPERTENSION: ICD-10-CM

## 2019-10-26 PROCEDURE — 74011000250 HC RX REV CODE- 250: Performed by: EMERGENCY MEDICINE

## 2019-10-26 PROCEDURE — 75810000293 HC SIMP/SUPERF WND  RPR

## 2019-10-26 PROCEDURE — 74011250637 HC RX REV CODE- 250/637: Performed by: EMERGENCY MEDICINE

## 2019-10-26 PROCEDURE — 99284 EMERGENCY DEPT VISIT MOD MDM: CPT

## 2019-10-26 PROCEDURE — 70110 X-RAY EXAM OF JAW 4/> VIEWS: CPT

## 2019-10-26 RX ORDER — BACITRACIN 500 [USP'U]/G
OINTMENT TOPICAL 3 TIMES DAILY
Qty: 1 TUBE | Refills: 0 | Status: SHIPPED | OUTPATIENT
Start: 2019-10-26 | End: 2019-11-05

## 2019-10-26 RX ORDER — OXYCODONE AND ACETAMINOPHEN 5; 325 MG/1; MG/1
2 TABLET ORAL
Status: COMPLETED | OUTPATIENT
Start: 2019-10-26 | End: 2019-10-26

## 2019-10-26 RX ORDER — NAPROXEN 500 MG/1
500 TABLET ORAL 2 TIMES DAILY WITH MEALS
Qty: 20 TAB | Refills: 0 | Status: SHIPPED | OUTPATIENT
Start: 2019-10-26

## 2019-10-26 RX ORDER — LIDOCAINE HYDROCHLORIDE 10 MG/ML
10 INJECTION, SOLUTION EPIDURAL; INFILTRATION; INTRACAUDAL; PERINEURAL ONCE
Status: COMPLETED | OUTPATIENT
Start: 2019-10-26 | End: 2019-10-26

## 2019-10-26 RX ADMIN — LIDOCAINE HYDROCHLORIDE 10 ML: 10 INJECTION, SOLUTION EPIDURAL; INFILTRATION; INTRACAUDAL at 20:09

## 2019-10-26 RX ADMIN — OXYCODONE HYDROCHLORIDE AND ACETAMINOPHEN 2 TABLET: 5; 325 TABLET ORAL at 20:09

## 2019-10-26 NOTE — LETTER
[facility] EMERGENCY DEPT 
[address] [facility] [address] 
[phone] Work/School Note Date: 10/26/2019 To Whom It May concern: 
 
Jef Benson was seen and treated today in the emergency room by the following provider(s): 
Attending Provider: Effie Cardenas MD. Jef Benson may return to work on 10/29/19. Sincerely, Tania Hernandez MD

## 2019-10-26 NOTE — ED PROVIDER NOTES
EMERGENCY DEPARTMENT HISTORY AND PHYSICAL EXAM      Please note that this dictation was completed with Vuze, the computer voice recognition software. Quite often unanticipated grammatical, syntax, homophones, and other interpretive errors are inadvertently transcribed by the computer software. Please disregard these errors and any errors that have escaped final proofreading. Thank you. Date: 10/26/2019  Patient Name: Robyn Alfaro  Patient Age and Sex: 59 y.o. male    History of Presenting Illness     Chief Complaint   Patient presents with    Laceration       History Provided By: Patient    HPI: Robyn Alfrao, 59 y.o. male with past medical history as documented below presents to the ED with c/o of acute lower lip lacerations PTA. Pt states he works nights and after he got off this morning, he went to the kitchen and tripped over a chair falling onto the floor. He denies LOC. He did bite his lower lip with bleeding noted. This happened at around [facility]. He reports tetanus is UTD. He waited over 12 hours to get seen because \"he thought he could control the bleeding. \" He noted worsening swelling and deformity which prompted his visit. Pt denies any other alleviating or exacerbating factors. Additionally, pt specifically denies any recent fever, chills, headache, nausea, vomiting, abdominal pain, CP, SOB, lightheadedness, dizziness, numbness, weakness, BLE swelling, heart palpitations, urinary sxs, diarrhea, constipation, melena, hematochezia, cough, or congestion. There are no other complaints, changes or physical findings at this time. PCP: Jody Dixon MD    Past History   Past Medical History:  Past Medical History:   Diagnosis Date    Erectile dysfunction     Hypertension     IGT (impaired glucose tolerance) 07/17/2018    S/P colonoscopy 02/27/2019    Sol Jamison - int hemorhoids - repeat 5 yrs       Past Surgical History:  History reviewed.  No pertinent surgical history. Family History:  Family History   Problem Relation Age of Onset    Diabetes Mother        Social History:  Social History     Tobacco Use    Smoking status: Never Smoker    Smokeless tobacco: Never Used   Substance Use Topics    Alcohol use: Yes     Comment: occasionally    Drug use: No       Allergies:  No Known Allergies    Current Medications:  No current facility-administered medications on file prior to encounter. Current Outpatient Medications on File Prior to Encounter   Medication Sig Dispense Refill    amLODIPine (NORVASC) 10 mg tablet TAKE 1 TABLET BY MOUTH EVERY DAY 90 Tab 7    lisinopril-hydroCHLOROthiazide (PRINZIDE, ZESTORETIC) 20-12.5 mg per tablet Take 1 Tab by mouth daily. 90 Tab 11    vardenafil (LEVITRA) 20 mg tablet Take 20 mg by mouth as needed. 10 Tab 11    aspirin delayed-release 81 mg tablet Take  by mouth daily. Review of Systems   Review of Systems   Constitutional: Negative. Negative for chills and fever. HENT: Negative. Negative for congestion, facial swelling, rhinorrhea, sore throat, trouble swallowing and voice change. Eyes: Negative. Respiratory: Negative. Negative for apnea, cough, chest tightness, shortness of breath and wheezing. Cardiovascular: Negative. Negative for chest pain, palpitations and leg swelling. Gastrointestinal: Negative. Negative for abdominal distention, abdominal pain, blood in stool, constipation, diarrhea, nausea and vomiting. Endocrine: Negative. Negative for cold intolerance, heat intolerance and polyuria. Genitourinary: Negative. Negative for difficulty urinating, dysuria, flank pain, frequency, hematuria and urgency. Musculoskeletal: Negative. Negative for arthralgias, back pain, myalgias, neck pain and neck stiffness. Skin: Positive for wound. Negative for color change and rash. Neurological: Negative.   Negative for dizziness, syncope, facial asymmetry, speech difficulty, weakness, light-headedness, numbness and headaches. Hematological: Negative. Does not bruise/bleed easily. Psychiatric/Behavioral: Negative. Negative for confusion and self-injury. The patient is not nervous/anxious. Physical Exam   Physical Exam   Constitutional: He is oriented to person, place, and time. Vital signs are normal. He appears well-developed and well-nourished. He is cooperative. Non-toxic appearance. HENT:   Head: Normocephalic. Mouth/Throat: Mucous membranes are normal. No posterior oropharyngeal erythema. approx 3cm U shaped lower lip avulsion noted, swelling noted, no FB, does NOT involve Vermillion border   Eyes: Pupils are equal, round, and reactive to light. Conjunctivae and EOM are normal.   Neck: Normal range of motion. Cardiovascular: Normal rate, regular rhythm, normal heart sounds and intact distal pulses. Exam reveals no gallop and no friction rub. No murmur heard. Pulmonary/Chest: Effort normal and breath sounds normal. No respiratory distress. He has no wheezes. He has no rales. He exhibits no tenderness. Abdominal: Soft. Bowel sounds are normal. He exhibits no distension and no mass. There is no tenderness. There is no rebound and no guarding. Musculoskeletal: Normal range of motion. He exhibits no edema, tenderness or deformity. Neurological: He is alert and oriented to person, place, and time. He displays normal reflexes. No cranial nerve deficit. He exhibits normal muscle tone. Coordination normal.   Skin: Skin is warm. No rash noted. Psychiatric: He has a normal mood and affect. Nursing note and vitals reviewed. Diagnostic Study Results     Labs -  No results found for this or any previous visit (from the past 24 hour(s)). Radiologic Studies -   XR MANDIBLE MIN 4 V   Final Result   IMPRESSION: No acute finding.             CT Results  (Last 48 hours)    None        CXR Results  (Last 48 hours)    None          Medical Decision Making   I am the first provider for this patient. I reviewed the vital signs, available nursing notes, past medical history, past surgical history, family history and social history. Vital Signs-Reviewed the patient's vital signs. Patient Vitals for the past 24 hrs:   Temp Pulse Resp BP SpO2   10/26/19 2118 98.2 °F (36.8 °C) 98 18 128/79 96 %   10/26/19 1957     96 %   10/26/19 1949 99 °F (37.2 °C) (!) 108 18 (!) 155/91 96 %       Pulse Oximetry Analysis - 96% on RA    Cardiac Monitor:   Rate: 98 bpm  Rhythm: Normal Sinus Rhythm      Records Reviewed: Nursing Notes, Old Medical Records, Previous electrocardiograms, Previous Radiology Studies and Previous Laboratory Studies    Provider Notes (Medical Decision Making):   Pt presents with resultant laceration of his lower lip requiring simple bedside repair. TDAP updated. NVI distally. Relatively clean wound, irrigated copiously and repaired in simple fashion with sutures. See procedure note below. No antibiotics indicated at this time. NVI per routine post repair. No overt e/o compartment syndrome. Discussed strict return precautions, follow up for staple removal and wound care. Pt understands and agrees with above plan. Discussed secondary healing, delayed repair giving prolong nature of injury, pt wishes for closure for cosmetic purposes, will close primarily, discharge home on PO abx. ED Course:   Initial assessment performed. The patients presenting problems have been discussed, and they are in agreement with the care plan formulated and outlined with them. I have encouraged them to ask questions as they arise throughout their visit. ALCOHOL/SUBSTANCE ABUSE COUNSELING:  Upon evaluation, pt endorsed recent alcohol/illicit drug use. For approximately 15 minutes, pt has been counseled on the dangers of alcohol and illicit drug use on their health, and they were encouraged to quit as soon as possible in order to decrease further risks to their health.  Pt has conveyed their understanding of the risks involved should they continue to use these products. HYPERTENSION COUNSELING   Education was provided to the patient today regarding their hypertension. Patient is made aware of their elevated blood pressure and is instructed to follow up this week with their Primary Care for a recheck. Patient is counseled regarding consequences of chronic, uncontrolled hypertension including kidney disease, heart disease, stroke or even death. Patient states their understanding and agrees to follow up this week. Additionally, during their visit, I discussed sodium restriction, maintaining ideal body weight and regular exercise program as physiologic means to achieve blood pressure control. The patient will strive towards this. I reviewed our electronic medical record system for any past medical records that were available that may contribute to the patient's current condition, the nursing notes and vital signs from today's visit. Shad Penaloza MD    ED Orders Placed :  Orders Placed This Encounter    XR MANDIBLE MIN 4 V    APPLY ICE TO SPECIFIED AREA    BETADINE SCRUB TO INCISION    SUTURE TRAY TO ROOM    lidocaine (PF) (XYLOCAINE) 10 mg/mL (1 %) injection 10 mL    oxyCODONE-acetaminophen (PERCOCET) 5-325 mg per tablet 2 Tab    bacitracin (BACITRACIN) 500 unit/gram oint    naproxen (NAPROSYN) 500 mg tablet     ED Medications Administered:  Medications   lidocaine (PF) (XYLOCAINE) 10 mg/mL (1 %) injection 10 mL (10 mL IntraDERMal Given by Provider 10/26/19 2009)   oxyCODONE-acetaminophen (PERCOCET) 5-325 mg per tablet 2 Tab (2 Tabs Oral Given 10/26/19 2009)         Procedure Note - Laceration Repair:  Procedure by Wild Ledesma MD.  Complexity: complex  3cm curved laceration to lower lip  was irrigated copiously with NS under jet lavage, prepped with Betadine and draped in a sterile fashion. The area was anesthetized with 2 mLs of  Lidocaine 1% without epinephrine via local infiltration.   The wound was explored with the following results: No foreign bodies found, No tendon laceration seen. The wound was repaired with One layer suture closure: Skin Layer:  3 sutures placed, stitch type:simple interrupted, suture: 5-0 nylon. .  The wound was closed with good hemostasis and approximation. Sterile dressing applied. Vermillion border intact: yes   Estimated blood loss: none  The procedure took 16-30 minutes, and pt tolerated well. Progress Note:  Patient has been reassessed and reports feeling better and symptoms have improved significantly after ED treatment. Patient feels comfortable going home with close follow-up. Rajat Mcallister's final labs and imaging have been reviewed with him and available family and/or caregiver. They have been counseled regarding his diagnosis. He verbally conveys understanding and agreement of the signs, symptoms, diagnosis, treatment and prognosis and additionally agrees to follow up as recommended with Dr. Clau Damian MD and/or specialist in 24 - 48 hours. He also agrees with the care-plan we created together and conveys that all of his questions have been answered. I have also put together some discharge instructions for him that include: 1) educational information regarding their diagnosis, 2) how to care for their diagnosis at home, as well a 3) list of reasons why they would want to return to the ED prior to their follow-up appointment should the patient's condition change or symptoms worsen. I have answered all questions to the patient's satisfaction. Strict return precautions given. He both understood and agreed with plan as discussed. Vital signs stable for discharge. Disposition: DISCHARGE  The pt is ready for discharge. The pt's signs, symptoms, diagnosis, and discharge instructions have been discussed and pt has conveyed their understanding. The pt is to follow up as recommended or return to ER should their symptoms worsen.  Plan has been discussed and pt is in agreement. PLAN:  1. Return precautions as discussed. 2.   Discharge Medication List as of 10/26/2019  9:09 PM      START taking these medications    Details   bacitracin (BACITRACIN) 500 unit/gram oint Apply  to affected area three (3) times daily for 10 days. Apply to affected area, Print, Disp-1 Tube, R-0      naproxen (NAPROSYN) 500 mg tablet Take 1 Tab by mouth two (2) times daily (with meals). , Print, Disp-20 Tab, R-0         CONTINUE these medications which have NOT CHANGED    Details   amLODIPine (NORVASC) 10 mg tablet TAKE 1 TABLET BY MOUTH EVERY DAY, Normal, Disp-90 Tab, R-7      lisinopril-hydroCHLOROthiazide (PRINZIDE, ZESTORETIC) 20-12.5 mg per tablet Take 1 Tab by mouth daily. , Normal, Disp-90 Tab, R-11      vardenafil (LEVITRA) 20 mg tablet Take 20 mg by mouth as needed., Normal, Disp-10 Tab, R-11      aspirin delayed-release 81 mg tablet Take  by mouth daily. , Historical Med           3. Follow-up Information     Follow up With Specialties Details Why Contact Info    Rhea Dodge MD Internal Medicine   [facility]  [address]  [facility] [phone]      [facility] EMERGENCY DEPT Emergency Medicine  As needed, If symptoms worsen [address] [facility]  [phone]          Return to ED if worse  Diagnosis     Clinical Impression:   1. Lip laceration, initial encounter    2. Fall from ground level    3. Accelerated hypertension        Attestation:  I personally performed the services described in this documentation on this date 10/26/2019 for patient, Ruthie Acharya. Emilee Harry MD      This note will not be viewable in [address]

## 2019-10-26 NOTE — ED NOTES
Pt arrived to ED via ambulatory with c/o lower lip laceration since this AM. Pt. Reports he works nights and after he got off this morning, he was in the kitchen and tripped over a chair falling face down hitting chin on floor and forcing upper teeth into lower lip. Bleeding is well controlled without pressure. Pt. Reports LOC for a \"few seconds\". Pt. Denies nausea and vomiting. Pt. States current on tetanus shot. Pt is in no acute distress. Will continue to monitor. See nursing assessment. Safety precautions in place; call light within reach. Emergency Department Nursing Plan of Care       The Nursing Plan of Care is developed from the Nursing assessment and Emergency Department Attending provider initial evaluation. The plan of care may be reviewed in the ED Provider note.     The Plan of Care was developed with the following considerations:   Patient / Family readiness to learn indicated by:verbalized understanding  Persons(s) to be included in education: patient  Barriers to Learning/Limitations:No    Signed     Sushila Queen RN    10/26/2019   7:54 PM

## 2019-10-26 NOTE — ED TRIAGE NOTES
Patient presents to ED with c/o laceration to lower lip since this morning. Patient states that he was walking in his kitchen and tripped an fell onto face. Patient noted to have a approx  2cm laceration to lower lip with a flap.

## 2019-10-27 NOTE — DISCHARGE INSTRUCTIONS
YOU HAVE 3 SUTURES THAT NEED TO BE REMOVED IN ABOUT 7 DAYS        Thank you for allowing us to take care of you today! We hope we addressed all of your concerns and needs. We strive to provide excellent quality care in the Emergency Department. You will receive a survey after your visit to evaluate the care you were provided. Should you receive a survey from us, we invite you to share your experience and tell us what made it excellent. It was a pleasure serving you, we invite you to share your experience with us, in our pursuit for excellence, should you be selected to receive a survey. The exam and treatment you received in the Emergency Department were for an urgent problem and are not intended as complete care. It is important that you follow up with a doctor, nurse practitioner, or physician assistant for ongoing care. If your symptoms become worse or you do not improve as expected and you are unable to reach your usual health care provider, you should return to the Emergency Department. We are available 24 hours a day. Please take your discharge instructions with you when you go to your follow-up appointment. If you have any problem arranging a follow-up appointment, contact the Emergency Department immediately. If a prescription has been provided, please have it filled as soon as possible to prevent a delay in treatment. Read the entire medication instruction sheet provided to you by the pharmacy. If you have any questions or reservations about taking the medication due to side effects or interactions with other medications, please call your primary care physician or contact the ER to speak with the charge nurse. Make an appointment with your family doctor or the physician you were referred to for follow-up of this visit as instructed on your discharge paperwork, as this is mandatory follow-up.  Return to the ER if you are unable to be seen or if you are unable to be seen in a timely manner. If you have any problem arranging the follow-up visit, contact the Emergency Department immediately. I hope you feel better and thank you again for allow us to provide you with excellent care today at [facility]! Warmest regards,    Kelsey Lopez MD  Emergency Medicine Physician  [facility]        _____________________________________________________________________________________________________________    Vitals:    10/26/19 1949 10/26/19 1957   BP: (!) 155/91    BP 1 Location: Right arm    BP Patient Position: During activity    Pulse: (!) 108    Resp: 18    Temp: 99 °F (37.2 °C)    SpO2: 96% 96%   Weight: 107 kg (236 lb)        No results found for this or any previous visit (from the past 12 hour(s)). XR MANDIBLE MIN 4 V   Final Result   IMPRESSION: No acute finding.             CT Results  (Last 48 hours)    None          Local Primary Care Physicians   [facility] [phone]  Romero Priest, MD Eugenie Fothergill, MD Shen Meraz MD [facility] [phone]  Jayson Macario, P  Gauri Connors, MD Reggie Rodriguez, MD Antonino Santos 83 [phone]  MD Roberto Tracy MD [facility] [phone]  MD Anila Hsu, MD Jaycob Hurtado, MD Destiney Smart MD   [facility] [phone]  [facility] CFXNFL , MD Saleem Dunn, MD Tom Hernandez, NP [address] [phone]  Barb Hassan, MD Elissa Britt, MD Olya Malin, MD Andres Bourne, MD Eugene Piña, MD Mervin Peters, MD Cynthia Jeffrey, MD   33 57 [facility]  Rhea Moore, MD [facility] [phone]  Saritha Arce, MD Minesh Durbin, NP  Carolina Salmeron, MD Manju Wang, MD Juan Lindsay, MD Deyanira Schaefer, MD Shani Dallas, MD   [facility] [address] [place] MD Katelyn Cotto, FNP  Viviane Joyce, NP  MD Geovany Matta MD Daril Ralph, MD Margaretann Grater, MD [facility] [phone]  Edelmiro Horning, MD Terrance Jeans, MD Criss Desanctis, MD Levander Harari, MD Truitt Music, MD   [facility] [phone]  MD Refugio Crenshaw MD [place] [phone]  MD Adele García MD Ruddy Heckle, MD   [facility] [phone]  MD Barbi Pino, MD Joaquim Torrez, MD Lynn Beltran MD [place], NARENDRA Hays MD 1619 [facility]   [phone]  MD Dre Arnold MD Sherlyn Faden, MD     2102 [facility] [phone]  MD Julissa Piper, FNP  True Machado, PAFILIBERTO Machado, FNP  Leon Mcqueen, MD Erin Koenig, NARENDRA Arango DO   Miscellaneous:  Heron Wood MD [facility] Dep[facility]   For adult and child immunizations, family planning, TB screening, STD testing and women's health services. [facility]: [place] [phone]     [facility] 25   657 [facility]   [address]   170 [facility]: [facility] 200 [facility] [phone]     [address] [place] [address]        [address] [address]  For primary care services, woman and child wellness, and some clinics providing specialty care. [facility] -- [facility]. [address] [place] [address] [phone]   411 [facility] 200 [facility] 3614 [facility] [phone]   339 [facility] [address] [place] [address] [phone]   [address] [address] [phone] 7700 [facility]  [address] [place] [address] [phone]   Naveed Barton  81 [facility] [phone]   Alvaro D[facility] 1051 [place], [place] [phone]   [facility]: [facility] Antonino Becker, [address]     [place] 3863 2425 Manny Bains 5850 [facility]  [phone]   [employer]  200 [facility] ([email or web address])         Sexual Health/Woman Wellness Clinics   For STD/HIV testing and treatment, pregnancy testing and services, men's health, birth control services, LGBT services, and hepatitis/HPV vaccine services. [facility] for [employer] 201 N. [facility] 75 [facility] 1579 600 [address] [facility] [phone]   [facility] [address] , 5th floor [phone]   Pregnancy 3928 [facility] [address] [facility] [address] [phone]        [place] 9967 [facility] 454 [facility]   [phone]   [place]   [phone]   Women, Infant and Children's Services: [address] [phone]       [place] of the [address] [place] [address]   [phone]   [address] [place] [address]   [phone]   [facility] [address]   1212 [facility]       Patient Education        Cuts: Care Instructions  Your Care Instructions  A cut can happen anywhere on your body. Stitches, staples, skin adhesives, or pieces of tape called Steri-Strips are sometimes used to keep the edges of a cut together and help it heal. Steri-Strips can be used by themselves or with stitches or staples. Sometimes cuts are left open. If the cut went deep and through the skin, the doctor may have closed the cut in two layers.  A deeper layer of stitches brings the deep part of the cut together. These stitches will dissolve and don't need to be removed. The upper layer closure, which could be stitches, staples, Steri-Strips, or adhesive, is what you see on the cut. A cut is often covered by a bandage. The doctor has checked you carefully, but problems can develop later. If you notice any problems or new symptoms, get medical treatment right away. Follow-up care is a key part of your treatment and safety. Be sure to make and go to all appointments, and call your doctor if you are having problems. It's also a good idea to know your test results and keep a list of the medicines you take. How can you care for yourself at home? If a cut is open or closed  · Prop up the sore area on a pillow anytime you sit or lie down during the next 3 days. Try to keep it above the level of your heart. This will help reduce swelling. · Keep the cut dry for the first 24 to 48 hours. After this, you can shower if your doctor okays it. Pat the cut dry. · Don't soak the cut, such as in a bathtub. Your doctor will tell you when it's safe to get the cut wet. · After the first 24 to 48 hours, clean the cut with soap and water 2 times a day unless your doctor gives you different instructions. ? Don't use hydrogen peroxide or alcohol, which can slow healing. ? You may cover the cut with a thin layer of petroleum jelly and a nonstick bandage. ? If the doctor put a bandage over the cut, put on a new bandage after cleaning the cut or if the bandage gets wet or dirty. · Avoid any activity that could cause your cut to reopen. · Be safe with medicines. Read and follow all instructions on the label. ? If the doctor gave you a prescription medicine for pain, take it as prescribed. ? If you are not taking a prescription pain medicine, ask your doctor if you can take an over-the-counter medicine. If the cut is closed with stitches, staples, or Steri-Strips  · Follow the above instructions for open or closed cuts.   · Do not remove the stitches or staples on your own. Your doctor will tell you when to come back to have the stitches or staples removed. · Leave Steri-Strips on until they fall off. If the cut is closed with a skin adhesive  · Follow the above instructions for open or closed cuts. · Leave the skin adhesive on your skin until it falls off on its own. This may take 5 to 10 days. · Do not scratch, rub, or pick at the adhesive. · Do not put the sticky part of a bandage directly on the adhesive. · Do not put any kind of ointment, cream, or lotion over the area. This can make the adhesive fall off too soon. Do not use hydrogen peroxide or alcohol, which can slow healing. When should you call for help? Call your doctor now or seek immediate medical care if:    · You have new pain, or your pain gets worse.     · The skin near the cut is cold or pale or changes color.     · You have tingling, weakness, or numbness near the cut.     · The cut starts to bleed, and blood soaks through the bandage. Oozing small amounts of blood is normal.     · You have trouble moving the area near the cut.     · You have symptoms of infection, such as:  ? Increased pain, swelling, warmth, or redness around the cut.  ? Red streaks leading from the cut.  ? Pus draining from the cut.  ? A fever.    Watch closely for changes in your health, and be sure to contact your doctor if:    · The cut reopens.     · You do not get better as expected. Where can you learn more? Go to http://sandip-veronica.info/. Enter M735 in the search box to learn more about \"Cuts: Care Instructions. \"  Current as of: June 26, 2019  Content Version: 12.2  © 4887-1707 [employer]. Care instructions adapted under license by [employer] (which disclaims liability or warranty for this information).  If you have questions about a medical condition or this instruction, always ask your healthcare professional. Janeen Benjamin disclaims any warranty or liability for your use of this information.

## 2019-10-27 NOTE — ED NOTES
Patient has been instructed that they have been given Percocet* which contains opioids, benzodiazepines, or other sedating drugs. Patient is aware that they  will need to refrain from driving or operating heavy machinery after taking this medication. Patient also instructed that they need to avoid drinking alcohol and using other products containing opioids, benzodiazepines, or other sedating drugs. Patient verbalized understanding.  bedside.

## 2019-11-16 RX ORDER — SILDENAFIL 100 MG/1
100 TABLET, FILM COATED ORAL AS NEEDED
Qty: 10 TAB | Refills: 11 | Status: SHIPPED | OUTPATIENT
Start: 2019-11-16

## 2019-12-16 ENCOUNTER — OFFICE VISIT (OUTPATIENT)
Dept: INTERNAL MEDICINE CLINIC | Age: 59
End: 2019-12-16

## 2019-12-16 VITALS
TEMPERATURE: 98 F | WEIGHT: 247.5 LBS | HEART RATE: 90 BPM | SYSTOLIC BLOOD PRESSURE: 139 MMHG | RESPIRATION RATE: 20 BRPM | BODY MASS INDEX: 36.66 KG/M2 | DIASTOLIC BLOOD PRESSURE: 84 MMHG | OXYGEN SATURATION: 96 % | HEIGHT: 69 IN

## 2019-12-16 DIAGNOSIS — N52.9 ERECTILE DYSFUNCTION, UNSPECIFIED ERECTILE DYSFUNCTION TYPE: ICD-10-CM

## 2019-12-16 DIAGNOSIS — E66.01 SEVERE OBESITY (HCC): ICD-10-CM

## 2019-12-16 DIAGNOSIS — R73.02 IGT (IMPAIRED GLUCOSE TOLERANCE): ICD-10-CM

## 2019-12-16 DIAGNOSIS — I10 ESSENTIAL HYPERTENSION: Primary | ICD-10-CM
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
## 2019-12-16 DIAGNOSIS — S09.93XS FACIAL TRAUMA, SEQUELA: ICD-10-CM

## 2019-12-16 NOTE — PROGRESS NOTES
SPORTS MEDICINE AND PRIMARY CARE  Stephanie Eddy MD, [address] [address]  Phone:  [phone]  Fax: [phone]       Chief Complaint   Patient presents with    Mouth Pain   . SUBJECTIVE:    Carlota Cassidy is a 62 y.o. male Patient returns today with known history of primary hypertension, impaired glucose tolerance, ED, obesity, and is seen for evaluation. Patient returns today after a fall in which he hit his face and particularly his lower lip. He saw the dentist.  ___________________ and nothing need be done, and he is concerned about a nodule in his lower lip and is seen for evaluation. Current Outpatient Medications   Medication Sig Dispense Refill    sildenafil citrate (VIAGRA) 100 mg tablet TAKE 1 TAB BY MOUTH AS NEEDED. 10 Tab 11    naproxen (NAPROSYN) 500 mg tablet Take 1 Tab by mouth two (2) times daily (with meals). 20 Tab 0    amLODIPine (NORVASC) 10 mg tablet TAKE 1 TABLET BY MOUTH EVERY DAY 90 Tab 7    lisinopril-hydroCHLOROthiazide (PRINZIDE, ZESTORETIC) 20-12.5 mg per tablet Take 1 Tab by mouth daily. 90 Tab 11    aspirin delayed-release 81 mg tablet Take  by mouth daily. Past Medical History:   Diagnosis Date    Erectile dysfunction     Facial trauma, sequela     Fall     Hypertension     IGT (impaired glucose tolerance) 07/17/2018    S/P colonoscopy 02/27/2019    Rafael Grijalva - int hemorhoids - repeat 5 yrs     History reviewed. No pertinent surgical history.   No Known Allergies      REVIEW OF SYSTEMS:  General: negative for - chills or fever  ENT: negative for - headaches, nasal congestion or tinnitus  Respiratory: negative for - cough, hemoptysis, shortness of breath or wheezing  Cardiovascular : negative for - chest pain, edema, palpitations or shortness of breath  Gastrointestinal: negative for - abdominal pain, blood in stools, heartburn or nausea/vomiting  Genito-Urinary: no dysuria, trouble voiding, or hematuria  Musculoskeletal: negative for - gait disturbance, joint pain, joint stiffness or joint swelling  Neurological: no TIA or stroke symptoms  Hematologic: no bruises, no bleeding, no swollen glands  Integument: no lumps, mole changes, nail changes or rash  Endocrine: no malaise/lethargy or unexpected weight changes      Social History     Socioeconomic History    Marital status:      Spouse name: Not on file    Number of children: Not on file    Years of education: Not on file    Highest education level: Not on file   Tobacco Use    Smoking status: Never Smoker    Smokeless tobacco: Never Used   Substance and Sexual Activity    Alcohol use: Yes     Comment: occasionally    Drug use: No    Sexual activity: Yes     Partners: Female     Birth control/protection: None   Social History Narrative    Habits:  Nonsmoker, non drug abuser. Drinks a couple cans of beer during the week. Wife notes a lot of beer         Social History:  The patient is . He is the father of two daughters ages 28 and 35, and a son age 25, and two grandchildren. He completed high school and lives with his wife (2018) [place]   Anglican background is Pentecostal.         Family History:  Father  in his late 25s as he was killed. Mother  67 with Alzheimer's dementia. One brother, two sisters are alive and well. One sister had stroke at the age of 48. Family History   Problem Relation Age of Onset    Diabetes Mother        OBJECTIVE:    Visit Vitals  /84   Pulse 90   Temp 98 °F (36.7 °C) (Oral)   Resp 20   Ht 5' 9\" (1.753 m)   Wt 247 lb 8 oz (112.3 kg)   SpO2 96%   BMI 36.55 kg/m²     CONSTITUTIONAL: well , well nourished, appears age appropriate  EYES: perrla, eom intact  ENMT:moist mucous membranes, pharynx clear  NECK: supple.  Thyroid normal  RESPIRATORY: Chest: clear bilaterally   CARDIOVASCULAR: Heart: regular rate and rhythm  GASTROINTESTINAL: Abdomen: soft, bowel sounds active  HEMATOLOGIC: no pathological lymph nodes palpated  MUSCULOSKELETAL: Extremities: no edema, pulse 1+   INTEGUMENT: No unusual rashes or suspicious skin lesions noted. Nails appear normal.  NEUROLOGIC: non-focal exam   MENTAL STATUS: alert and oriented, appropriate affect           ASSESSMENT:  1. Essential hypertension    2. IGT (impaired glucose tolerance)    3. Erectile dysfunction, unspecified erectile dysfunction type    4. Severe obesity ([address]    5. Facial trauma, sequela    6. Fall, sequela      BP control is at goal.    He has a history of IGT and will check that again on his annual.    ED with little change. He continues to have a history of obesity and on this occasion he has gained 11 lbs. We cautioned him. The facial tremor resulted in the two upper teeth loose and a subcutaneous nodule in the lower lip. Nothing more that we can suggest for him except perhaps a mouth guard, eating soft foods and the nodule is a posttraumatic nodule and will just take a tincture of time to resolve. He will be back to see us in ______________ for his annual.          Discussed the patient's BMI with him. The BMI follow up plan is as follows:     dietary management education, guidance, and counseling  encourage exercise  monitor weight  prescribed dietary intake    I have discussed the diagnosis with the patient and the intended plan as seen in the  orders above. The patient understands and agees with the plan. The patient has   received an after visit summary and questions were answered concerning  future plans  Patient labs and/or xrays were reviewed  Past records were reviewed. PLAN:  . No orders of the defined types were placed in this encounter. Follow-up and Dispositions    · Return in about 7 months (around 7/16/2020). ATTENTION:   This medical record was transcribed using an electronic medical records system.   Although proofread, it may and can contain electronic and spelling errors. Other human spelling and other errors may be present. Corrections may be executed at a later time. Please feel free to contact us for any clarifications as needed.

## 2019-12-16 NOTE — PATIENT INSTRUCTIONS
Body Mass Index: Care Instructions Your Care Instructions Body mass index (BMI) can help you see if your weight is raising your risk for health problems. It uses a formula to compare how much you weigh with how tall you are. · A BMI lower than 18.5 is considered underweight. · A BMI between 18.5 and 24.9 is considered healthy. · A BMI between 25 and 29.9 is considered overweight. A BMI of 30 or higher is considered obese. If your BMI is in the normal range, it means that you have a lower risk for weight-related health problems. If your BMI is in the overweight or obese range, you may be at increased risk for weight-related health problems, such as high blood pressure, heart disease, stroke, arthritis or joint pain, and diabetes. If your BMI is in the underweight range, you may be at increased risk for health problems such as fatigue, lower protection (immunity) against illness, muscle loss, bone loss, hair loss, and hormone problems. BMI is just one measure of your risk for weight-related health problems. You may be at higher risk for health problems if you are not active, you eat an unhealthy diet, or you drink too much alcohol or use tobacco products. Follow-up care is a key part of your treatment and safety. Be sure to make and go to all appointments, and call your doctor if you are having problems. It's also a good idea to know your test results and keep a list of the medicines you take. How can you care for yourself at home? · Practice healthy eating habits. This includes eating plenty of fruits, vegetables, whole grains, lean protein, and low-fat dairy. · If your doctor recommends it, get more exercise. Walking is a good choice. Bit by bit, increase the amount you walk every day. Try for at least 30 minutes on most days of the week. · Do not smoke. Smoking can increase your risk for health problems.  If you need help quitting, talk to your doctor about stop-smoking programs and medicines. These can increase your chances of quitting for good. · Limit alcohol to 2 drinks a day for men and 1 drink a day for women. Too much alcohol can cause health problems. If you have a BMI higher than 25 · Your doctor may do other tests to check your risk for weight-related health problems. This may include measuring the distance around your waist. A waist measurement of more than 40 inches in men or 35 inches in women can increase the risk of weight-related health problems. · Talk with your doctor about steps you can take to stay healthy or improve your health. You may need to make lifestyle changes to lose weight and stay healthy, such as changing your diet and getting regular exercise. If you have a BMI lower than 18.5 · Your doctor may do other tests to check your risk for health problems. · Talk with your doctor about steps you can take to stay healthy or improve your health. You may need to make lifestyle changes to gain or maintain weight and stay healthy, such as getting more healthy foods in your diet and doing exercises to build muscle. Where can you learn more? Go to http://sandip-veronica.info/. Enter S176 in the search box to learn more about \"Body Mass Index: Care Instructions. \" Current as of: October 13, 2016 Content Version: 11.4 © 6628-9352 [employer]. Care instructions adapted under license by [employer] (which disclaims liability or warranty for this information). If you have questions about a medical condition or this instruction, always ask your healthcare professional. [address] any warranty or liability for your use of this information.

## 2019-12-16 NOTE — PROGRESS NOTES
1. Have you been to the ER, urgent care clinic since your last visit? Hospitalized since your last visit? Yes When: 10-26-19 Where: [facility] Reason for visit: busted lip    2. Have you seen or consulted any other health care providers outside of the [address] [place] [address] since your last visit? Include any pap smears or colon screening.  No     Wants to discuss mouth issues

## 2020-09-21 RX ORDER — AMLODIPINE BESYLATE 10 MG/1
TABLET ORAL
Qty: 90 TAB | Refills: 7 | Status: SHIPPED | OUTPATIENT
Start: 2020-09-21 | End: 2021-10-08

## 2020-10-02 RX ORDER — LISINOPRIL AND HYDROCHLOROTHIAZIDE 12.5; 2 MG/1; MG/1
TABLET ORAL
Qty: 90 TAB | Refills: 11 | Status: SHIPPED | OUTPATIENT
Start: 2020-10-02 | End: 2021-10-08

## 2022-01-06 ENCOUNTER — OFFICE VISIT (OUTPATIENT)
Dept: INTERNAL MEDICINE CLINIC | Age: 62
End: 2022-01-06
Payer: COMMERCIAL

## 2022-01-06 VITALS
OXYGEN SATURATION: 99 % | WEIGHT: 246 LBS | DIASTOLIC BLOOD PRESSURE: 107 MMHG | TEMPERATURE: 98.8 F | HEART RATE: 94 BPM | HEIGHT: 69 IN | SYSTOLIC BLOOD PRESSURE: 160 MMHG | BODY MASS INDEX: 36.43 KG/M2 | RESPIRATION RATE: 16 BRPM

## 2022-01-06 DIAGNOSIS — N52.9 ERECTILE DYSFUNCTION, UNSPECIFIED ERECTILE DYSFUNCTION TYPE: ICD-10-CM

## 2022-01-06 DIAGNOSIS — E66.01 SEVERE OBESITY (HCC): ICD-10-CM

## 2022-01-06 DIAGNOSIS — E78.5 DYSLIPIDEMIA: ICD-10-CM

## 2022-01-06 DIAGNOSIS — I10 PRIMARY HYPERTENSION: Primary | ICD-10-CM

## 2022-01-06 DIAGNOSIS — R73.02 IGT (IMPAIRED GLUCOSE TOLERANCE): ICD-10-CM

## 2022-01-06 PROCEDURE — 99214 OFFICE O/P EST MOD 30 MIN: CPT | Performed by: INTERNAL MEDICINE

## 2022-01-06 RX ORDER — LISINOPRIL AND HYDROCHLOROTHIAZIDE 12.5; 2 MG/1; MG/1
1 TABLET ORAL DAILY
Qty: 90 TABLET | Refills: 3 | Status: SHIPPED | OUTPATIENT
Start: 2022-01-06 | End: 2022-03-28

## 2022-01-06 RX ORDER — AMLODIPINE BESYLATE 10 MG/1
10 TABLET ORAL DAILY
Qty: 90 TABLET | Refills: 3 | Status: SHIPPED | OUTPATIENT
Start: 2022-01-06

## 2022-01-06 NOTE — PROGRESS NOTES
SPORTS MEDICINE AND PRIMARY CARE  Cheikh Petit MD, [address] [address]  Phone:  [phone]  Fax: [phone]       Chief Complaint   Patient presents with    Complete Physical   .      SUBJECTIVE:    Isaiah Boo is a 59 y.o. male Patient returns today with a known history of severe obesity, impaired glucose tolerance, ED, primary hypertension, and dyslipidemia and is seen for evaluation. Patient states he needs blood pressure medicine, blood pressure is high. He has some stress on the job, but other than that he is doing okay. Patient is seen for evaluation. Current Outpatient Medications   Medication Sig Dispense Refill    lisinopril-hydroCHLOROthiazide (PRINZIDE, ZESTORETIC) 20-12.5 mg per tablet Take 1 Tablet by mouth daily. Appointment required for further refills 90 Tablet 3    amLODIPine (NORVASC) 10 mg tablet Take 1 Tablet by mouth daily. 90 Tablet 3    aspirin delayed-release 81 mg tablet Take  by mouth daily.  sildenafil citrate (VIAGRA) 100 mg tablet TAKE 1 TAB BY MOUTH AS NEEDED. (Patient not taking: Reported on 1/6/2022) 10 Tab 11    naproxen (NAPROSYN) 500 mg tablet Take 1 Tab by mouth two (2) times daily (with meals). (Patient not taking: Reported on 1/6/2022) 20 Tab 0     Past Medical History:   Diagnosis Date    Dyslipidemia     Erectile dysfunction     Facial trauma, sequela     Fall     Hypertension     IGT (impaired glucose tolerance) 07/17/2018    S/P colonoscopy 02/27/2019    Ivet Huynh - int hemorhoids - repeat 5 yrs     History reviewed. No pertinent surgical history.   No Known Allergies      REVIEW OF SYSTEMS:  General: negative for - chills or fever  ENT: negative for - headaches, nasal congestion or tinnitus  Respiratory: negative for - cough, hemoptysis, shortness of breath or wheezing  Cardiovascular : negative for - chest pain, edema, palpitations or shortness of breath  Gastrointestinal: negative for - abdominal pain, blood in stools, heartburn or nausea/vomiting  Genito-Urinary: no dysuria, trouble voiding, or hematuria  Musculoskeletal: negative for - gait disturbance, joint pain, joint stiffness or joint swelling  Neurological: no TIA or stroke symptoms  Hematologic: no bruises, no bleeding, no swollen glands  Integument: no lumps, mole changes, nail changes or rash  Endocrine: no malaise/lethargy or unexpected weight changes      Social History     Socioeconomic History    Marital status:    Tobacco Use    Smoking status: Never Smoker    Smokeless tobacco: Never Used   Substance and Sexual Activity    Alcohol use: Yes     Comment: occasionally    Drug use: No    Sexual activity: Yes     Partners: Female     Birth control/protection: None   Social History Narrative    Habits:  Nonsmoker, non drug abuser. Drinks a couple cans of beer during the week. Wife notes a lot of beer         Social History:  The patient is . He is the father of two daughters ages 28 and 35, and a son age 25, and two grandchildren. He completed high school and lives with his wife (2018) pier   Sikh background is [employer]          Family History:  Father  in his late 25s as he was killed. Mother  67 with Alzheimer's dementia. One brother, two sisters are alive and well. One sister had stroke at the age of 48. Family History   Problem Relation Age of Onset    Diabetes Mother        OBJECTIVE:    Visit Vitals  BP (!) 160/107   Pulse 94   Temp 98.8 °F (37.1 °C)   Resp 16   Ht 5' 9\" (1.753 m)   Wt 246 lb (111.6 kg)   SpO2 99%   BMI 36.33 kg/m²     CONSTITUTIONAL: well , well nourished, appears age appropriate  EYES: perrla, eom intact  ENMT:moist mucous membranes, pharynx clear  NECK: supple.  Thyroid normal  RESPIRATORY: Chest: clear bilaterally   CARDIOVASCULAR: Heart: regular rate and rhythm  GASTROINTESTINAL: Abdomen: soft, bowel sounds active  HEMATOLOGIC: no pathological lymph nodes palpated  MUSCULOSKELETAL: Extremities: no edema, pulse 1+   INTEGUMENT: No unusual rashes or suspicious skin lesions noted. Nails appear normal.  NEUROLOGIC: non-focal exam   MENTAL STATUS: alert and oriented, appropriate affect           ASSESSMENT:  1. Primary hypertension    2. Severe obesity ([address]    3. IGT (impaired glucose tolerance)    4. Erectile dysfunction, unspecified erectile dysfunction type    5. Dyslipidemia      Blood pressure control is lacking related to the fact that he is not taking his blood pressure pills, he ran out last week, and also related to the fact that there are stressors at home. This is his second marriage. First marriage lasted seven years, this marriage has lasted only three years and they have gone to a marriage counselor, but they are both \"set in their ways\". In addition, he is working a stressful job and so all this is a lot on him, he states. He states he will get through it. Severe obesity, plans to get down to at least 210 and we do not disagree with him. We will check hemoglobin A1c for impaired glucose tolerance. ED is more related to stress. We will look at his lipids and make recommendations based on those findings. He will be back to see us in a month, primarily so we can look at his blood pressure, and every four to six months thereafter. I have discussed the diagnosis with the patient and the intended plan as seen in the  Orders. The patient understands and agees with the plan. The patient has   received an after visit summary and questions were answered concerning  future plans  Patient labs and/or xrays were reviewed  Past records were reviewed.     PLAN:  .  Orders Placed This Encounter    URINALYSIS W/ RFLX MICROSCOPIC    CBC WITH AUTOMATED DIFF    METABOLIC PANEL, COMPREHENSIVE    LIPID PANEL    PROSTATE SPECIFIC AG    APOLIPOPROTEIN B    HEMOGLOBIN A1C WITH EAG    lisinopril-hydroCHLOROthiazide (PRINZIDE, ZESTORETIC) 20-12.5 mg per tablet    amLODIPine (NORVASC) 10 mg tablet       Follow-up and Dispositions    · Return in about 4 weeks (around 2/3/2022). ATTENTION:   This medical record was transcribed using an electronic medical records system. Although proofread, it may and can contain electronic and spelling errors. Other human spelling and other errors may be present. Corrections may be executed at a later time. Please feel free to contact us for any clarifications as needed.

## 2022-01-06 NOTE — PROGRESS NOTES
Chief Complaint   Patient presents with    Complete Physical     1. Have you been to the ER, urgent care clinic since your last visit? Hospitalized since your last visit? No    2. Have you seen or consulted any other health care providers outside of the [address] [place] [address] since your last visit? Include any pap smears or colon screening.  No

## 2022-01-07 LAB
ALBUMIN SERPL-MCNC: 4.4 G/DL (ref 3.5–5)
ALBUMIN/GLOB SERPL: 1.2 {RATIO} (ref 1.1–2.2)
ALP SERPL-CCNC: 56 U/L (ref 45–117)
ALT SERPL-CCNC: 32 U/L (ref 12–78)
ANION GAP SERPL CALC-SCNC: 6 MMOL/L (ref 5–15)
APPEARANCE UR: CLEAR
AST SERPL-CCNC: 25 U/L (ref 15–37)
BACTERIA URNS QL MICRO: ABNORMAL /HPF
BASOPHILS # BLD: 0.1 K/UL (ref 0–0.1)
BASOPHILS NFR BLD: 1 % (ref 0–1)
BILIRUB SERPL-MCNC: 0.8 MG/DL (ref 0.2–1)
BILIRUB UR QL: NEGATIVE
BUN SERPL-MCNC: 12 MG/DL (ref 6–20)
BUN/CREAT SERPL: 11 (ref 12–20)
CALCIUM SERPL-MCNC: 10 MG/DL (ref 8.5–10.1)
CHLORIDE SERPL-SCNC: 103 MMOL/L (ref 97–108)
CHOLEST SERPL-MCNC: 212 MG/DL
CO2 SERPL-SCNC: 27 MMOL/L (ref 21–32)
COLOR UR: ABNORMAL
CREAT SERPL-MCNC: 1.12 MG/DL (ref 0.7–1.3)
DIFFERENTIAL METHOD BLD: NORMAL
EOSINOPHIL # BLD: 0.4 K/UL (ref 0–0.4)
EOSINOPHIL NFR BLD: 7 % (ref 0–7)
EPITH CASTS URNS QL MICRO: ABNORMAL /LPF
ERYTHROCYTE [DISTWIDTH] IN BLOOD BY AUTOMATED COUNT: 14.3 % (ref 11.5–14.5)
EST. AVERAGE GLUCOSE BLD GHB EST-MCNC: 123 MG/DL
GLOBULIN SER CALC-MCNC: 3.6 G/DL (ref 2–4)
GLUCOSE SERPL-MCNC: 115 MG/DL (ref 65–100)
GLUCOSE UR STRIP.AUTO-MCNC: NEGATIVE MG/DL
HBA1C MFR BLD: 5.9 % (ref 4–5.6)
HCT VFR BLD AUTO: 49 % (ref 36.6–50.3)
HDLC SERPL-MCNC: 70 MG/DL
HDLC SERPL: 3 {RATIO} (ref 0–5)
HGB BLD-MCNC: 16.1 G/DL (ref 12.1–17)
HGB UR QL STRIP: NEGATIVE
HYALINE CASTS URNS QL MICRO: ABNORMAL /LPF (ref 0–5)
IMM GRANULOCYTES # BLD AUTO: 0 K/UL (ref 0–0.04)
IMM GRANULOCYTES NFR BLD AUTO: 0 % (ref 0–0.5)
KETONES UR QL STRIP.AUTO: NEGATIVE MG/DL
LDLC SERPL CALC-MCNC: 131.6 MG/DL (ref 0–100)
LEUKOCYTE ESTERASE UR QL STRIP.AUTO: NEGATIVE
LYMPHOCYTES # BLD: 2.4 K/UL (ref 0.8–3.5)
LYMPHOCYTES NFR BLD: 40 % (ref 12–49)
MCH RBC QN AUTO: 30.7 PG (ref 26–34)
MCHC RBC AUTO-ENTMCNC: 32.9 G/DL (ref 30–36.5)
MCV RBC AUTO: 93.3 FL (ref 80–99)
MONOCYTES # BLD: 0.7 K/UL (ref 0–1)
MONOCYTES NFR BLD: 12 % (ref 5–13)
NEUTS SEG # BLD: 2.4 K/UL (ref 1.8–8)
NEUTS SEG NFR BLD: 40 % (ref 32–75)
NITRITE UR QL STRIP.AUTO: NEGATIVE
NRBC # BLD: 0 K/UL (ref 0–0.01)
NRBC BLD-RTO: 0 PER 100 WBC
PH UR STRIP: 5 [PH] (ref 5–8)
PLATELET # BLD AUTO: 231 K/UL (ref 150–400)
PMV BLD AUTO: 9.8 FL (ref 8.9–12.9)
POTASSIUM SERPL-SCNC: 4.2 MMOL/L (ref 3.5–5.1)
PROT SERPL-MCNC: 8 G/DL (ref 6.4–8.2)
PROT UR STRIP-MCNC: ABNORMAL MG/DL
PSA SERPL-MCNC: 0.9 NG/ML (ref 0.01–4)
RBC # BLD AUTO: 5.25 M/UL (ref 4.1–5.7)
RBC #/AREA URNS HPF: ABNORMAL /HPF (ref 0–5)
SODIUM SERPL-SCNC: 136 MMOL/L (ref 136–145)
SP GR UR REFRACTOMETRY: 1.01 (ref 1–1.03)
TRIGL SERPL-MCNC: 52 MG/DL (ref ?–150)
UROBILINOGEN UR QL STRIP.AUTO: 0.2 EU/DL (ref 0.2–1)
VLDLC SERPL CALC-MCNC: 10.4 MG/DL
WBC # BLD AUTO: 6 K/UL (ref 4.1–11.1)
WBC URNS QL MICRO: ABNORMAL /HPF (ref 0–4)

## 2022-01-07 RX ORDER — ROSUVASTATIN CALCIUM 5 MG/1
5 TABLET, COATED ORAL
Qty: 30 TABLET | Refills: 11 | Status: SHIPPED | OUTPATIENT
Start: 2022-01-07 | End: 2022-03-28 | Stop reason: SDUPTHER

## 2022-01-08 LAB — APO B SERPL-MCNC: 97 MG/DL

## 2022-01-08 NOTE — PROGRESS NOTES
Your laboratory results are good with the exception of your cholesterol. Your current 10-please consider ASCVD risk is 13.9% which is intermediate. Please consider taking a statin to lower your cholesterol.   I have sent Crestar to your pharmacist.  I would suggest you come back in 3 to 4 months for a nurse visit to check your lipid panel to see how you are doing it with medication or with your diet

## 2022-03-07 ENCOUNTER — OFFICE VISIT (OUTPATIENT)
Dept: INTERNAL MEDICINE CLINIC | Age: 62
End: 2022-03-07
Payer: COMMERCIAL

## 2022-03-07 VITALS
WEIGHT: 241.6 LBS | RESPIRATION RATE: 20 BRPM | OXYGEN SATURATION: 97 % | SYSTOLIC BLOOD PRESSURE: 153 MMHG | DIASTOLIC BLOOD PRESSURE: 92 MMHG | TEMPERATURE: 98.2 F | HEIGHT: 69 IN | HEART RATE: 92 BPM | BODY MASS INDEX: 35.78 KG/M2

## 2022-03-07 DIAGNOSIS — R73.02 IGT (IMPAIRED GLUCOSE TOLERANCE): ICD-10-CM

## 2022-03-07 DIAGNOSIS — G45.9 TIA (TRANSIENT ISCHEMIC ATTACK): ICD-10-CM

## 2022-03-07 DIAGNOSIS — E66.01 SEVERE OBESITY (HCC): ICD-10-CM

## 2022-03-07 DIAGNOSIS — I10 PRIMARY HYPERTENSION: Primary | ICD-10-CM

## 2022-03-07 DIAGNOSIS — E78.5 DYSLIPIDEMIA: ICD-10-CM

## 2022-03-07 PROCEDURE — 99214 OFFICE O/P EST MOD 30 MIN: CPT | Performed by: INTERNAL MEDICINE

## 2022-03-07 RX ORDER — METOPROLOL SUCCINATE 25 MG/1
25 TABLET, EXTENDED RELEASE ORAL
Qty: 30 TABLET | Refills: 5 | Status: SHIPPED | OUTPATIENT
Start: 2022-03-07 | End: 2022-08-29

## 2022-03-07 NOTE — PROGRESS NOTES
SPORTS MEDICINE AND PRIMARY CARE  Renetta Mcarthur MD, [address] [address]  Phone:  [phone]  Fax: [phone]      Chief Complaint   Patient presents with    Hypertension         SUBECTIVE:    Sonia Albarado is a 59 y.o. male Patient returns today with a known history of primary hypertension, impaired glucose tolerance, severe obesity, dyslipidemia and is seen for evaluation. This past weekend he had an episode that lasted for five days, where her right arm was just \"dead\", he could not move it or do anything. He had a drink in his hand, could not hold it, he kept trying to work and it finally got completely back to normal.  As a result, he would like to stay off of work for the next week and return to work on Monday. Patient is seen for evaluation. Current Outpatient Medications   Medication Sig Dispense Refill    metoprolol succinate (TOPROL-XL) 25 mg XL tablet Take 1 Tablet by mouth nightly. 30 Tablet 5    rosuvastatin (CRESTOR) 5 mg tablet Take 1 Tablet by mouth nightly. 30 Tablet 11    lisinopril-hydroCHLOROthiazide (PRINZIDE, ZESTORETIC) 20-12.5 mg per tablet Take 1 Tablet by mouth daily. Appointment required for further refills 90 Tablet 3    amLODIPine (NORVASC) 10 mg tablet Take 1 Tablet by mouth daily. 90 Tablet 3    sildenafil citrate (VIAGRA) 100 mg tablet TAKE 1 TAB BY MOUTH AS NEEDED. 10 Tab 11    naproxen (NAPROSYN) 500 mg tablet Take 1 Tab by mouth two (2) times daily (with meals). 20 Tab 0    aspirin delayed-release 81 mg tablet Take  by mouth daily. Past Medical History:   Diagnosis Date    Dyslipidemia     Erectile dysfunction     Facial trauma, sequela     Fall     Hypertension     IGT (impaired glucose tolerance) 07/17/2018    S/P colonoscopy 02/27/2019    Tita Wade - ligia hemorhoids - repeat 5 yrs    TIA (transient ischemic attack) 03/07/2022     History reviewed. No pertinent surgical history.   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 It is a 12-hour shift and he wears a mask, but it is just very stressful for him. He states he has to do something else. Social History     Socioeconomic History    Marital status:    Tobacco Use    Smoking status: Never Smoker    Smokeless tobacco: Never Used   Vaping Use    Vaping Use: Never used   Substance and Sexual Activity    Alcohol use: Yes     Comment: occasionally    Drug use: No    Sexual activity: Yes     Partners: Female     Birth control/protection: None   Social History Narrative    Habits:  Nonsmoker, non drug abuser. Drinks a couple cans of beer during the week. Wife notes a lot of beer         Social History:  The patient is . He is the father of two daughters ages 28 and 35, and a son age 25, and two grandchildren. He completed high school and lives with his wife (2018) [place]   Protestant background is [employer]          Family History:  Father  in his late 25s as he was killed. Mother  67 with Alzheimer's dementia. One brother, two sisters are alive and well. One sister had stroke at the age of 48. r  Family History   Problem Relation Age of Onset    Diabetes Mother        OBJECTIVE:  Visit Vitals  BP (!) 153/92   Pulse 92   Temp 98.2 °F (36.8 °C) (Oral)   Resp 20   Ht 5' 9\" (1.753 m)   Wt 241 lb 9.6 oz (109.6 kg)   SpO2 97%   BMI 35.68 kg/m²     ENT: perrla,  eom intact  NECK: supple.  Thyroid normal  CHEST: clear to ascultation and percussion   HEART: regular rate and rhythm  ABD: soft, bowel sounds active  EXTREMITIES: no edema, pulse 1+     Office Visit on 2022   Component Date Value Ref Range Status    Hemoglobin A1c 2022 5.9* 4.0 - 5.6 % Final    Comment: NEW METHOD PLEASE NOTE NEW REFERENCE RANGE  (NOTE)  HbA1C Interpretive Ranges  <5.7              Normal  5.7 - 6.4         Consider Prediabetes  >6.5              Consider Diabetes      Est. average glucose 01/06/2022 123  mg/dL Final    Apolipoprotein B 01/06/2022 97* <90 mg/dL Final    Comment: (NOTE)                          Desirable               < 90                          Borderline High     90 -  99                          High               100 - 130                          Very High               >130      --------------------------------------------------           ASCVD RISK              THERAPEUTIC TARGET            CATEGORY                  APO B (mg/dL)         Very High Risk        <80 (if extreme risk <70)         High Risk             <90         Moderate Risk         <90  Performed At: [facility] & [address] [id number]  Cesar Amin MD QA:[id number]      Prostate Specific Ag 01/06/2022 0.9  0.01 - 4.0 ng/mL Final    Comment: Method used is [employer]  (NOTE)  Many types of test methods are used to measure PSA and can yield   different results on any given specimen. Therefore PSA results from   different laboratories on the same patient are not directly   comparable. In addition, PSA values by themselves should not be   interpreted as the presence or absence of cancer. PSA values used to   monitor for biochemical recurrence of prostate cancer should be   interpreted in accordance with current clinical guidelines (e.g. the   2013 American Urological Association (AUA) guidelines and the 2015    Association of Urology (EAU) guidelines).  Cholesterol, total 01/06/2022 212* <200 MG/DL Final    Triglyceride 01/06/2022 52  <150 MG/DL Final    Comment: Based on NCEP-ATP III:  Triglycerides <150 mg/dL  is considered normal, 150-199  mg/dL  borderline high,  200-499 mg/dL high and  greater than or equal to 500  mg/dL very high.       HDL Cholesterol 01/06/2022 70  MG/DL Final    Comment: Based on NCEP ATP III, HDL Cholesterol <40 mg/dL is considered low and >60  mg/dL is elevated.  LDL, calculated 01/06/2022 131.6* 0 - 100 MG/DL Final    Comment: Based on the NCEP-ATP: LDL-C concentrations are considered  optimal <100 mg/dL,  near optimal/above Normal 100-129 mg/dL Borderline High: 130-159, High: 160-189  mg/dL Very High: Greater than or equal to 190 mg/dL      VLDL, calculated 01/06/2022 10.4  MG/DL Final    CHOL/HDL Ratio 01/06/2022 3.0  0.0 - 5.0   Final    Sodium 01/06/2022 136  136 - 145 mmol/L Final    Potassium 01/06/2022 4.2  3.5 - 5.1 mmol/L Final    Chloride 01/06/2022 103  97 - 108 mmol/L Final    CO2 01/06/2022 27  21 - 32 mmol/L Final    Anion gap 01/06/2022 6  5 - 15 mmol/L Final    Glucose 01/06/2022 115* 65 - 100 mg/dL Final    BUN 01/06/2022 12  6 - 20 MG/DL Final    Creatinine 01/06/2022 1.12  0.70 - 1.30 MG/DL Final    BUN/Creatinine ratio 01/06/2022 11* 12 - 20   Final    GFR est AA 01/06/2022 >60  >60 ml/min/1.73m2 Final    GFR est non-AA 01/06/2022 >60  >60 ml/min/1.73m2 Final    Comment: Estimated GFR is calculated using the IDMS-traceable Modification of Diet in  Renal Disease (MDRD) Study equation, reported for both  Americans  (GFRAA) and non- Americans (GFRNA), and normalized to 1.73m2 body  surface area. The physician must decide which value applies to the patient.  Calcium 01/06/2022 10.0  8.5 - 10.1 MG/DL Final    Bilirubin, total 01/06/2022 0.8  0.2 - 1.0 MG/DL Final    ALT (SGPT) 01/06/2022 32  12 - 78 U/L Final    AST (SGOT) 01/06/2022 25  15 - 37 U/L Final    Alk. phosphatase 01/06/2022 56  45 - 117 U/L Final    Protein, total 01/06/2022 8.0  6.4 - 8.2 g/dL Final    Albumin 01/06/2022 4.4  3.5 - 5.0 g/dL Final    Globulin 01/06/2022 3.6  2.0 - 4.0 g/dL Final    A-G Ratio 01/06/2022 1.2  1.1 - 2.2   Final    WBC 01/06/2022 6.0  4.1 - 11.1 K/uL Final    RBC 01/06/2022 5.25  4. 10 - 5.70 M/uL Final    HGB 01/06/2022 16.1  12.1 - 17.0 g/dL Final    HCT 01/06/2022 49.0  36.6 - 50.3 % Final    MCV 01/06/2022 93.3  80.0 - 99.0 FL Final    MCH 01/06/2022 30.7  26.0 - 34.0 PG Final    MCHC 01/06/2022 32.9  30.0 - 36.5 g/dL Final    RDW 01/06/2022 14.3  11.5 - 14.5 % Final    PLATELET 92/56/2519 590  150 - 400 K/uL Final    MPV 01/06/2022 9.8  8.9 - 12.9 FL Final    NRBC 01/06/2022 0.0  0  WBC Final    ABSOLUTE NRBC 01/06/2022 0.00  0.00 - 0.01 K/uL Final    NEUTROPHILS 01/06/2022 40  32 - 75 % Final    LYMPHOCYTES 01/06/2022 40  12 - 49 % Final    MONOCYTES 01/06/2022 12  5 - 13 % Final    EOSINOPHILS 01/06/2022 7  0 - 7 % Final    BASOPHILS 01/06/2022 1  0 - 1 % Final    IMMATURE GRANULOCYTES 01/06/2022 0  0.0 - 0.5 % Final    ABS. NEUTROPHILS 01/06/2022 2.4  1.8 - 8.0 K/UL Final    ABS. LYMPHOCYTES 01/06/2022 2.4  0.8 - 3.5 K/UL Final    ABS. MONOCYTES 01/06/2022 0.7  0.0 - 1.0 K/UL Final    ABS. EOSINOPHILS 01/06/2022 0.4  0.0 - 0.4 K/UL Final    ABS. BASOPHILS 01/06/2022 0.1  0.0 - 0.1 K/UL Final    ABS. IMM.  GRANS. 01/06/2022 0.0  0.00 - 0.04 K/UL Final    DF 01/06/2022 AUTOMATED    Final    Color 01/06/2022 YELLOW/STRAW    Final    Color Reference Range: Straw, Yellow or Dark Yellow    Appearance 01/06/2022 CLEAR  CLEAR   Final    Specific gravity 01/06/2022 1.013  1.003 - 1.030   Final    pH (UA) 01/06/2022 5.0  5.0 - 8.0   Final    Protein 01/06/2022 TRACE* Negative mg/dL Final    Glucose 01/06/2022 Negative  Negative mg/dL Final    Ketone 01/06/2022 Negative  Negative mg/dL Final    Bilirubin 01/06/2022 Negative  Negative   Final    Blood 01/06/2022 Negative  Negative   Final    Urobilinogen 01/06/2022 0.2  0.2 - 1.0 EU/dL Final    Nitrites 01/06/2022 Negative  Negative   Final    Leukocyte Esterase 01/06/2022 Negative  Negative   Final    WBC 01/06/2022 0-4  0 - 4 /hpf Final    RBC 01/06/2022 0-5  0 - 5 /hpf Final    Epithelial cells 01/06/2022 FEW  FEW /lpf Final    Comment: Epithelial cell category consists of squamous cells and /or transitional  urothelial cells. Renal tubular cells, if present, are separately identified as  such.  Bacteria 01/06/2022 1+* Negative /hpf Final    Hyaline cast 01/06/2022 0-2  0 - 5 /lpf Final          ASSESSMENT:  1. Primary hypertension    2. IGT (impaired glucose tolerance)    3. Severe obesity ([address]    4. Dyslipidemia    5. TIA (transient ischemic attack)      Blood pressure is elevated today, although he tells me he had his DOT physical and it was in the 130s and passed his DOT physical, they just mentioned that he had a tachycardia. He has impaired glucose tolerance and lab studies are acceptable. Obesity remains a concern and we encouraged a heart healthy, weight reducing diet. He has dyslipidemia. We started him on Rosuvastatin, which he is taking currently for his cholesterol. We will repeat that level in three months and except it to come all the way down to normal.  Our goal now would be 70 in view of the episode of TIA. TIA was with aspirin. I am reluctant to add Plavix as he is having some tooth issues. We will do appropriate evaluation, including MRI, carotid dopplers, echo, 24 hour Holter monitor and have him come back shortly or in two weeks after all the studies have been completed. We give him a note to return to work Monday. He will be back to see us sooner if needed. If he has another episode, we encouraged him to go to [facility] emergency room. I have discussed the diagnosis with the patient and the intended plan as seen in the  orders above. The patient understands and agees with the plan. The patient has   received an after visit summary and questions were answered concerning  future plans  Patient labs and/or xrays were reviewed  Past records were reviewed.     PLAN:  .  Orders Placed This Encounter    MRI BRAIN W WO CONT    T4, FREE    TSH 3RD GENERATION    metoprolol succinate (TOPROL-XL) 25 mg XL tablet       Follow-up and Dispositions    · Return in about 2 weeks (around 3/21/2022). Follow-up and Disposition History                   ATTENTION:   This medical record was transcribed using an electronic medical records system. Although proofread, it may and can contain electronic and spelling errors. Other human spelling and other errors may be present. Corrections may be executed at a later time. Please feel free to contact us for any clarifications as needed.

## 2022-03-07 NOTE — PROGRESS NOTES
1. Have you been to the ER, urgent care clinic since your last visit? Hospitalized since your last visit? No    2. Have you seen or consulted any other health care providers outside of the [address] [place] [address] since your last visit? Include any pap smears or colon screening.  No     Wants to discuss lost of mobility in left arm

## 2022-03-08 LAB
T4 FREE SERPL-MCNC: 1.1 NG/DL (ref 0.8–1.5)
TSH SERPL DL<=0.05 MIU/L-ACNC: 1.18 UIU/ML (ref 0.36–3.74)

## 2022-03-09 NOTE — PROGRESS NOTES
Normal thyroid studies. Sure you keep your appointment for the different studies we requested. If you have another episode I remind you to go to the emergency room immediately.   Please send me a note if you have any concerns or questions

## 2022-03-19 PROBLEM — E66.01 SEVERE OBESITY (HCC): Status: ACTIVE | Noted: 2018-12-31

## 2022-03-19 PROBLEM — G45.9 TIA (TRANSIENT ISCHEMIC ATTACK): Status: ACTIVE | Noted: 2022-03-07

## 2022-03-19 PROBLEM — Z98.890 S/P COLONOSCOPY: Status: ACTIVE | Noted: 2019-02-27

## 2022-03-20 PROBLEM — R73.02 IGT (IMPAIRED GLUCOSE TOLERANCE): Status: ACTIVE | Noted: 2018-07-17

## 2022-03-24 ENCOUNTER — HOSPITAL ENCOUNTER (OUTPATIENT)
Dept: MRI IMAGING | Age: 62
Discharge: HOME | End: 2022-03-24
Attending: INTERNAL MEDICINE
Payer: COMMERCIAL

## 2022-03-24 ENCOUNTER — HOSPITAL ENCOUNTER (OUTPATIENT)
Dept: NON INVASIVE DIAGNOSTICS | Age: 62
Discharge: HOME | End: 2022-03-24
Attending: INTERNAL MEDICINE
Payer: COMMERCIAL

## 2022-03-24 ENCOUNTER — HOSPITAL ENCOUNTER (OUTPATIENT)
Dept: VASCULAR SURGERY | Age: 62
Discharge: HOME | End: 2022-03-24
Attending: INTERNAL MEDICINE
Payer: COMMERCIAL

## 2022-03-24 VITALS
DIASTOLIC BLOOD PRESSURE: 92 MMHG | HEIGHT: 69 IN | BODY MASS INDEX: 35.79 KG/M2 | WEIGHT: 241.62 LBS | SYSTOLIC BLOOD PRESSURE: 153 MMHG

## 2022-03-24 DIAGNOSIS — G45.9 TIA (TRANSIENT ISCHEMIC ATTACK): ICD-10-CM

## 2022-03-24 DIAGNOSIS — E78.5 DYSLIPIDEMIA: ICD-10-CM

## 2022-03-24 DIAGNOSIS — I65.23 BILATERAL CAROTID ARTERY STENOSIS: ICD-10-CM

## 2022-03-24 LAB
ECHO AO ROOT DIAM: 3.2 CM
ECHO AO ROOT INDEX: 1.43 CM/M2
ECHO AV AREA PEAK VELOCITY: 2.8 CM2
ECHO AV AREA/BSA PEAK VELOCITY: 1.3 CM2/M2
ECHO AV MEAN GRADIENT: 3 MMHG
ECHO AV MEAN VELOCITY: 0.9 M/S
ECHO AV PEAK GRADIENT: 5 MMHG
ECHO AV PEAK VELOCITY: 1.1 M/S
ECHO AV VELOCITY RATIO: 1
ECHO AV VTI: 19.6 CM
ECHO LA DIAMETER INDEX: 1.56 CM/M2
ECHO LA DIAMETER: 3.5 CM
ECHO LA TO AORTIC ROOT RATIO: 1.09
ECHO LA VOL 4C: 42 ML (ref 18–58)
ECHO LA VOLUME INDEX A4C: 19 ML/M2 (ref 16–34)
ECHO LV E' LATERAL VELOCITY: 8 CM/S
ECHO LV E' SEPTAL VELOCITY: 6 CM/S
ECHO LV EDV A4C: 85 ML
ECHO LV EDV INDEX A4C: 38 ML/M2
ECHO LV EJECTION FRACTION A4C: 52 %
ECHO LV ESV A4C: 40 ML
ECHO LV ESV INDEX A4C: 18 ML/M2
ECHO LV FRACTIONAL SHORTENING: 24 % (ref 28–44)
ECHO LV INTERNAL DIMENSION DIASTOLE INDEX: 2.01 CM/M2
ECHO LV INTERNAL DIMENSION DIASTOLIC: 4.5 CM (ref 4.2–5.9)
ECHO LV INTERNAL DIMENSION SYSTOLIC INDEX: 1.52 CM/M2
ECHO LV INTERNAL DIMENSION SYSTOLIC: 3.4 CM
ECHO LV IVSD: 1 CM (ref 0.6–1)
ECHO LV MASS 2D: 164 G (ref 88–224)
ECHO LV MASS INDEX 2D: 73.2 G/M2 (ref 49–115)
ECHO LV POSTERIOR WALL DIASTOLIC: 1.1 CM (ref 0.6–1)
ECHO LV RELATIVE WALL THICKNESS RATIO: 0.49
ECHO LVOT AREA: 2.8 CM2
ECHO LVOT DIAM: 1.9 CM
ECHO LVOT PEAK GRADIENT: 4 MMHG
ECHO LVOT PEAK VELOCITY: 1.1 M/S
ECHO MV A VELOCITY: 0.79 M/S
ECHO MV E DECELERATION TIME (DT): 149.3 MS
ECHO MV E VELOCITY: 0.59 M/S
ECHO MV E/A RATIO: 0.75
ECHO MV E/E' LATERAL: 7.38
ECHO MV E/E' RATIO (AVERAGED): 8.6
ECHO MV E/E' SEPTAL: 9.83
ECHO MV MAX VELOCITY: 1.3 M/S
ECHO MV MEAN GRADIENT: 2 MMHG
ECHO MV MEAN VELOCITY: 0.6 M/S
ECHO MV PEAK GRADIENT: 7 MMHG
ECHO MV VTI: 15.3 CM
ECHO PULMONARY ARTERY END DIASTOLIC PRESSURE: 10 MMHG
ECHO PV MAX VELOCITY: 1 M/S
ECHO PV PEAK GRADIENT: 4 MMHG
ECHO PV REGURGITANT MAX VELOCITY: 1.6 M/S
LEFT CCA DIST DIAS: 34.9 CM/S
LEFT CCA DIST SYS: 122.6 CM/S
LEFT CCA PROX DIAS: 30.5 CM/S
LEFT CCA PROX SYS: 98.5 CM/S
LEFT ECA DIAS: 12.9 CM/S
LEFT ECA SYS: 80.9 CM/S
LEFT ICA DIST DIAS: 18.9 CM/S
LEFT ICA DIST SYS: 43.8 CM/S
LEFT ICA MID DIAS: 18.9 CM/S
LEFT ICA MID SYS: 57.2 CM/S
LEFT ICA PROX DIAS: 12.9 CM/S
LEFT ICA PROX SYS: 45.8 CM/S
LEFT ICA/CCA SYS: 0.5
LEFT VERTEBRAL DIAS: 10.9 CM/S
LEFT VERTEBRAL SYS: 32.9 CM/S
RIGHT CCA DIST DIAS: 34.9 CM/S
RIGHT CCA DIST SYS: 135.8 CM/S
RIGHT CCA PROX DIAS: 26 CM/S
RIGHT CCA PROX SYS: 106.8 CM/S
RIGHT ECA DIAS: 12.9 CM/S
RIGHT ECA SYS: 105.1 CM/S
RIGHT ICA DIST DIAS: 28.3 CM/S
RIGHT ICA DIST SYS: 61.2 CM/S
RIGHT ICA MID DIAS: 21.7 CM/S
RIGHT ICA MID SYS: 96.3 CM/S
RIGHT ICA PROX DIAS: 30.5 CM/S
RIGHT ICA PROX SYS: 113.9 CM/S
RIGHT ICA/CCA SYS: 0.8
RIGHT VERTEBRAL DIAS: 17.3 CM/S
RIGHT VERTEBRAL SYS: 56.8 CM/S
VAS LEFT SUBCLAVIAN PROX EDV: 0 CM/S
VAS LEFT SUBCLAVIAN PROX PSV: 129.2 CM/S
VAS RIGHT SUBCLAVIAN PROX EDV: 0 CM/S
VAS RIGHT SUBCLAVIAN PROX PSV: 76.8 CM/S

## 2022-03-24 PROCEDURE — 74011250636 HC RX REV CODE- 250/636: Performed by: INTERNAL MEDICINE

## 2022-03-24 PROCEDURE — A9576 INJ PROHANCE MULTIPACK: HCPCS | Performed by: INTERNAL MEDICINE

## 2022-03-24 PROCEDURE — 96374 THER/PROPH/DIAG INJ IV PUSH: CPT

## 2022-03-24 PROCEDURE — 93880 EXTRACRANIAL BILAT STUDY: CPT

## 2022-03-24 PROCEDURE — 93225 XTRNL ECG REC<48 HRS REC: CPT

## 2022-03-24 PROCEDURE — 93880 EXTRACRANIAL BILAT STUDY: CPT | Performed by: PSYCHIATRY & NEUROLOGY

## 2022-03-24 PROCEDURE — 70553 MRI BRAIN STEM W/O & W/DYE: CPT

## 2022-03-24 PROCEDURE — 93306 TTE W/DOPPLER COMPLETE: CPT | Performed by: INTERNAL MEDICINE

## 2022-03-24 RX ADMIN — GADOTERIDOL 20 ML: 279.3 INJECTION, SOLUTION INTRAVENOUS at 13:44

## 2022-03-28 ENCOUNTER — OFFICE VISIT (OUTPATIENT)
Dept: INTERNAL MEDICINE CLINIC | Age: 62
End: 2022-03-28
Payer: COMMERCIAL

## 2022-03-28 VITALS
HEART RATE: 96 BPM | WEIGHT: 241.2 LBS | DIASTOLIC BLOOD PRESSURE: 94 MMHG | SYSTOLIC BLOOD PRESSURE: 154 MMHG | BODY MASS INDEX: 35.73 KG/M2 | RESPIRATION RATE: 16 BRPM | HEIGHT: 69 IN | OXYGEN SATURATION: 94 % | TEMPERATURE: 98 F

## 2022-03-28 DIAGNOSIS — E78.5 DYSLIPIDEMIA: ICD-10-CM

## 2022-03-28 DIAGNOSIS — G45.9 TIA (TRANSIENT ISCHEMIC ATTACK): Primary | ICD-10-CM

## 2022-03-28 DIAGNOSIS — R73.02 IGT (IMPAIRED GLUCOSE TOLERANCE): ICD-10-CM

## 2022-03-28 DIAGNOSIS — E66.01 SEVERE OBESITY (HCC): ICD-10-CM

## 2022-03-28 DIAGNOSIS — I10 PRIMARY HYPERTENSION: ICD-10-CM

## 2022-03-28 PROCEDURE — 99213 OFFICE O/P EST LOW 20 MIN: CPT | Performed by: INTERNAL MEDICINE

## 2022-03-28 RX ORDER — CHLORHEXIDINE GLUCONATE 1.2 MG/ML
RINSE ORAL
COMMUNITY
Start: 2022-03-07

## 2022-03-28 RX ORDER — ROSUVASTATIN CALCIUM 10 MG/1
10 TABLET, COATED ORAL
Qty: 30 TABLET | Refills: 11 | Status: SHIPPED | OUTPATIENT
Start: 2022-03-28

## 2022-03-28 RX ORDER — TELMISARTAN AND HYDROCHLORTHIAZIDE 80; 12.5 MG/1; MG/1
1 TABLET ORAL DAILY
Qty: 30 TABLET | Refills: 11 | Status: SHIPPED | OUTPATIENT
Start: 2022-03-28

## 2022-03-28 NOTE — PROGRESS NOTES
SPORTS MEDICINE AND PRIMARY CARE  Lisa Corado MD, [address] [address]  Phone:  [phone]  Fax: [phone]       Chief Complaint   Patient presents with    Hypertension   . SUBJECTIVE:    Hoa Albarado is a 59 y.o. male Patient returns today with a known history of TIA and underwent evaluation to include an MRI of the brain, which revealed no acute findings and some prominent nonspecific white matter changes, carotid doppler exam, that revealed mild stenosis of the left or right ICA, both vertebrals were antegrade. He also had an echocardiogram on 03/24, which was unremarkable. Trivial localized pericardial effusion was present. Ejection fraction 55-60%. No further issues. He has had no episodes. He still has not gotten his tooth repaired and it is going to be done on the 19th of next month. It is not hurting as much as it was. We recall that we did not start him on Plavix because of the tooth. Patient has no new complaints and is seen for evaluation. Current Outpatient Medications   Medication Sig Dispense Refill    rosuvastatin (CRESTOR) 10 mg tablet Take 1 Tablet by mouth nightly. 30 Tablet 11    telmisartan-hydroCHLOROthiazide (MICARDIS HCT) 80-12.5 mg per tablet Take 1 Tablet by mouth daily. 30 Tablet 11    metoprolol succinate (TOPROL-XL) 25 mg XL tablet Take 1 Tablet by mouth nightly. 30 Tablet 5    amLODIPine (NORVASC) 10 mg tablet Take 1 Tablet by mouth daily. 90 Tablet 3    sildenafil citrate (VIAGRA) 100 mg tablet TAKE 1 TAB BY MOUTH AS NEEDED. 10 Tab 11    naproxen (NAPROSYN) 500 mg tablet Take 1 Tab by mouth two (2) times daily (with meals). 20 Tab 0    aspirin delayed-release 81 mg tablet Take  by mouth daily.       chlorhexidine (PERIDEX) 0.12 % solution SWISH WITH 1/2 OZ FOR A MINUTE THEN SPIT TWICE A DAY       Past Medical History:   Diagnosis Date    Dyslipidemia     Erectile dysfunction     Facial trauma, sequela     Fall  Hypertension     IGT (impaired glucose tolerance) 2018    S/P colonoscopy 2019    Matthias Counter - int hemorhoids - repeat 5 yrs    TIA (transient ischemic attack) 2022     History reviewed. No pertinent surgical history. No Known Allergies      REVIEW OF SYSTEMS:  General: negative for - chills or fever  ENT: negative for - headaches, nasal congestion or tinnitus  Respiratory: negative for - cough, hemoptysis, shortness of breath or wheezing  Cardiovascular : negative for - chest pain, edema, palpitations or shortness of breath  Gastrointestinal: negative for - abdominal pain, blood in stools, heartburn or nausea/vomiting  Genito-Urinary: no dysuria, trouble voiding, or hematuria  Musculoskeletal: negative for - gait disturbance, joint pain, joint stiffness or joint swelling  Neurological: no TIA or stroke symptoms  Hematologic: no bruises, no bleeding, no swollen glands  Integument: no lumps, mole changes, nail changes or rash  Endocrine: no malaise/lethargy or unexpected weight changes      Social History     Socioeconomic History    Marital status:    Tobacco Use    Smoking status: Never Smoker    Smokeless tobacco: Never Used   Vaping Use    Vaping Use: Never used   Substance and Sexual Activity    Alcohol use: Yes     Comment: occasionally    Drug use: No    Sexual activity: Yes     Partners: Female     Birth control/protection: None   Social History Narrative    Habits:  Nonsmoker, non drug abuser. Drinks a couple cans of beer during the week. Wife notes a lot of beer         Social History:  The patient is . He is the father of two daughters ages 28 and 35, and a son age 25, and two grandchildren. 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 Mother  67 with Alzheimer's dementia.   One brother, two sisters are alive and well. One sister had stroke at the age of 48. Family History   Problem Relation Age of Onset    Diabetes Mother        OBJECTIVE:    Visit Vitals  BP (!) 154/94 (BP 1 Location: Left upper arm, BP Patient Position: Sitting, BP Cuff Size: Large adult)   Pulse 96   Temp 98 °F (36.7 °C) (Oral)   Resp 16   Ht 5' 9\" (1.753 m)   Wt 241 lb 3.2 oz (109.4 kg)   SpO2 94%   BMI 35.62 kg/m²     CONSTITUTIONAL: well , well nourished, appears age appropriate  EYES: perrla, eom intact  ENMT:moist mucous membranes, pharynx clear  NECK: supple. Thyroid normal  RESPIRATORY: Chest: clear bilaterally   CARDIOVASCULAR: Heart: regular rate and rhythm  GASTROINTESTINAL: Abdomen: soft, bowel sounds active  HEMATOLOGIC: no pathological lymph nodes palpated  MUSCULOSKELETAL: Extremities: no edema, pulse 1+   INTEGUMENT: No unusual rashes or suspicious skin lesions noted. Nails appear normal.  NEUROLOGIC: non-focal exam   MENTAL STATUS: alert and oriented, appropriate affect           ASSESSMENT:  1. TIA (transient ischemic attack)    2. Dyslipidemia    3. IGT (impaired glucose tolerance)    4. Primary hypertension    5. Severe obesity ([address]      We wanted to start dual antiplatelet therapy with aspirin and Plavix, as he had an ABCD2 score of 6, however, he was having issues with a tooth and I would concern he would have to have some dental work done because it was rather painful, and therefore Plavix was not started and he was continued on aspirin. The window of opportunity has nearly closed as we would only use the dual antiplatelet therapy for the first 21 days. So we will wait for the 48 hour Holter monitor and report the results and if there is anything further that we need to do for his evaluation. He will be back to see me in a month. We discontinue Lisinopril and place him on Micardis 80/12.5. Dyslipidemia.   We will intensify his anti-cholesterol medication by increasing the Rosuvastatin to 10 mg daily. He has impaired glucose tolerance, which has been stable. Blood pressure control, as noted above, is not where I want to see it and therefore adjustments have been made. He is working on his diet. He will be back to see me in one month. As opposed to TIA, we should probably label this as a brain stem infarct as the duration was longer than a TIA and the MRI was negative. I have discussed the diagnosis with the patient and the intended plan as seen in the  Orders. The patient understands and agees with the plan. The patient has   received an after visit summary and questions were answered concerning  future plans  Patient labs and/or xrays were reviewed  Past records were reviewed. PLAN:  .  Orders Placed This Encounter    chlorhexidine (PERIDEX) 0.12 % solution    rosuvastatin (CRESTOR) 10 mg tablet    telmisartan-hydroCHLOROthiazide (MICARDIS HCT) 80-12.5 mg per tablet                  ATTENTION:   This medical record was transcribed using an electronic medical records system. Although proofread, it may and can contain electronic and spelling errors. Other human spelling and other errors may be present. Corrections may be executed at a later time. Please feel free to contact us for any clarifications as needed.

## 2022-03-28 NOTE — PROGRESS NOTES
Rm    Chief Complaint   Patient presents with    Hypertension        Visit Vitals  BP (!) 154/94 (BP 1 Location: Left upper arm, BP Patient Position: Sitting, BP Cuff Size: Large adult)   Pulse 96   Temp 98 °F (36.7 °C) (Oral)   Resp 16   Ht 5' 9\" (1.753 m)   Wt 241 lb 3.2 oz (109.4 kg)   SpO2 94%   BMI 35.62 kg/m²        1. Have you been to the ER, urgent care clinic since your last visit? Hospitalized since your last visit? No    2. Have you seen or consulted any other health care providers outside of the [address] [place] [address] since your last visit? Include any pap smears or colon screening. No     There are no preventive care reminders to display for this patient. 3 most recent PHQ Screens 3/28/2022   Little interest or pleasure in doing things Not at all   Feeling down, depressed, irritable, or hopeless Not at all   Total Score PHQ 2 0        No flowsheet data found.     Learning Assessment 7/5/2019   PRIMARY LEARNER Patient   HIGHEST LEVEL OF EDUCATION - PRIMARY LEARNER  GRADUATED HIGH SCHOOL OR GED   PRIMARY LANGUAGE ENGLISH   LEARNER PREFERENCE PRIMARY LISTENING   ANSWERED BY patient   RELATIONSHIP SELF

## 2022-03-31 PROCEDURE — 93244 EXT ECG>48HR<7D REV&INTERPJ: CPT | Performed by: INTERNAL MEDICINE

## 2022-09-01 ENCOUNTER — OFFICE VISIT (OUTPATIENT)
Dept: INTERNAL MEDICINE CLINIC | Age: 62
End: 2022-09-01
Payer: COMMERCIAL

## 2022-09-01 VITALS
WEIGHT: 246.1 LBS | DIASTOLIC BLOOD PRESSURE: 88 MMHG | HEART RATE: 80 BPM | OXYGEN SATURATION: 98 % | HEIGHT: 69 IN | TEMPERATURE: 98.1 F | SYSTOLIC BLOOD PRESSURE: 157 MMHG | BODY MASS INDEX: 36.45 KG/M2 | RESPIRATION RATE: 17 BRPM

## 2022-09-01 DIAGNOSIS — R73.02 IGT (IMPAIRED GLUCOSE TOLERANCE): ICD-10-CM

## 2022-09-01 DIAGNOSIS — R10.30 LOWER ABDOMINAL PAIN: ICD-10-CM

## 2022-09-01 DIAGNOSIS — E66.01 SEVERE OBESITY (HCC): ICD-10-CM

## 2022-09-01 DIAGNOSIS — G45.9 TIA (TRANSIENT ISCHEMIC ATTACK): ICD-10-CM

## 2022-09-01 DIAGNOSIS — E78.5 DYSLIPIDEMIA: ICD-10-CM

## 2022-09-01 DIAGNOSIS — I10 PRIMARY HYPERTENSION: Primary | ICD-10-CM

## 2022-09-01 PROCEDURE — 99214 OFFICE O/P EST MOD 30 MIN: CPT | Performed by: INTERNAL MEDICINE

## 2022-09-01 PROCEDURE — 36415 COLL VENOUS BLD VENIPUNCTURE: CPT | Performed by: INTERNAL MEDICINE

## 2022-09-01 RX ORDER — POLYETHYLENE GLYCOL 3350 17 G/17G
17 POWDER, FOR SOLUTION ORAL 2 TIMES DAILY
Qty: 60 PACKET | Refills: 11 | Status: SHIPPED | OUTPATIENT
Start: 2022-09-01

## 2022-09-01 NOTE — PROGRESS NOTES
SPORTS MEDICINE AND PRIMARY CARE  Eliot Ridley MD, [address] [address]  Phone:  [phone]  Fax: [phone]       Chief Complaint   Patient presents with    Abdominal Pain     Lower abd pain, comes and go, going on for a few weeks. .      SUBJECTIVE:    Ruslan Dill is a 59 y.o. male Patient returns today with a known history of primary hypertension, obesity, impaired glucose tolerance, dyslipidemia, TIA and is seen for evaluation. Since we last saw him, he has retired now and is only working a couple days a week and gets Social Security. He looked at the finances and said they both work and he was just tired of working. He also has discomfort in his lower abdomen, which he thinks is related to his weight. Current Outpatient Medications   Medication Sig Dispense Refill    polyethylene glycol (MIRALAX) 17 gram packet Take 1 Packet by mouth two (2) times a day. 60 Packet 11    metoprolol succinate (TOPROL-XL) 25 mg XL tablet TAKE 1 TABLET BY MOUTH NIGHTLY 90 Tablet 3    chlorhexidine (PERIDEX) 0.12 % solution SWISH WITH 1/2 OZ FOR A MINUTE THEN SPIT TWICE A DAY      rosuvastatin (CRESTOR) 10 mg tablet Take 1 Tablet by mouth nightly. 30 Tablet 11    telmisartan-hydroCHLOROthiazide (MICARDIS HCT) 80-12.5 mg per tablet Take 1 Tablet by mouth daily. 30 Tablet 11    amLODIPine (NORVASC) 10 mg tablet Take 1 Tablet by mouth daily. 90 Tablet 3    sildenafil citrate (VIAGRA) 100 mg tablet TAKE 1 TAB BY MOUTH AS NEEDED. 10 Tab 11    naproxen (NAPROSYN) 500 mg tablet Take 1 Tab by mouth two (2) times daily (with meals). 20 Tab 0    aspirin delayed-release 81 mg tablet Take  by mouth daily.        Past Medical History:   Diagnosis Date    Dyslipidemia     Erectile dysfunction     Facial trauma, sequela     Fall     Hypertension     IGT (impaired glucose tolerance) 07/17/2018    S/P colonoscopy 02/27/2019    Audrey Roman - int hemorhoids - repeat 5 yrs    TIA (transient ischemic attack) 2022     History reviewed. No pertinent surgical history. No Known Allergies      REVIEW OF SYSTEMS:  General: negative for - chills or fever  ENT: negative for - headaches, nasal congestion or tinnitus  Respiratory: negative for - cough, hemoptysis, shortness of breath or wheezing  Cardiovascular : negative for - chest pain, edema, palpitations or shortness of breath  Gastrointestinal: negative for - abdominal pain, blood in stools, heartburn or nausea/vomiting  Genito-Urinary: no dysuria, trouble voiding, or hematuria  Musculoskeletal: negative for - gait disturbance, joint pain, joint stiffness or joint swelling  Neurological: no TIA or stroke symptoms  Hematologic: no bruises, no bleeding, no swollen glands  Integument: no lumps, mole changes, nail changes or rash  Endocrine: no malaise/lethargy or unexpected weight changes      Social History     Socioeconomic History    Marital status:    Tobacco Use    Smoking status: Never    Smokeless tobacco: Never   Vaping Use    Vaping Use: Never used   Substance and Sexual Activity    Alcohol use: Yes     Comment: occasionally    Drug use: No    Sexual activity: Yes     Partners: Female     Birth control/protection: None   Social History Narrative    Habits:  Nonsmoker, non drug abuser. Drinks a couple cans of beer during the week. Wife notes a lot of beer         Social History:  The patient is . He is the father of two daughters ages 28 and 35, and a son age 25, and two grandchildren. 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 Mother  67 with Alzheimer's dementia. One brother, two sisters are alive and well. One sister had stroke at the age of 48.          Family History   Problem Relation Age of Onset    Diabetes Mother        OBJECTIVE:    Visit Vitals  BP (!) 157/88 (BP 1 Location: Right arm, BP Patient Position: Sitting, BP Cuff Size: Adult)   Pulse 80   Temp 98.1 °F (36.7 °C) (Oral)   Resp 17   Ht 5' 9\" (1.753 m)   Wt 246 lb 1.6 oz (111.6 kg)   SpO2 98%   BMI 36.34 kg/m²     CONSTITUTIONAL: well , well nourished, appears age appropriate  EYES: perrla, eom intact  ENMT:moist mucous membranes, pharynx clear  NECK: supple. Thyroid normal  RESPIRATORY: Chest: clear bilaterally   CARDIOVASCULAR: Heart: regular rate and rhythm  GASTROINTESTINAL: Abdomen: soft, bowel sounds active  HEMATOLOGIC: no pathological lymph nodes palpated  MUSCULOSKELETAL: Extremities: no edema, pulse 1+   INTEGUMENT: No unusual rashes or suspicious skin lesions noted. Nails appear normal.  NEUROLOGIC: non-focal exam   MENTAL STATUS: alert and oriented, appropriate affect           ASSESSMENT:  1. Primary hypertension    2. Severe obesity ([address]    3. IGT (impaired glucose tolerance)    4. Dyslipidemia    5. TIA (transient ischemic attack)    6. Lower abdominal pain      Vague abdominal discomfort related to abdominal apron. He thinks it is related to the belt and it may very well be as abdominal exam was benign. We suggest when he stars using suspenders if he continues to have the discomfort to let us know and we will get a CT scan of the abdomen and pelvis. BP is up because of multiple issues, including working, having a beer, etc.  Blood pressure is in the 130s at home. No adjustment will therefore be made. Obesity remains a concern and he claims he will start losing his weight. Impaired glucose tolerance and we will check hemoglobin A1c. We have him on Rosuvastatin for his dyslipidemia and we will check a lipid panel today and if it is not where we want it to be, we will increase the Rosuvastatin. No further episodes of TIA. He will be back to see us in four months, sooner if he has any problems.     On further discussion and consideration of his complaint, we will ask for a CT of his abdomen and pelvis to rule out pathology of the lower abdomen. I have discussed the diagnosis with the patient and the intended plan as seen in the  Orders. The patient understands and agees with the plan. The patient has   received an after visit summary and questions were answered concerning  future plans  Patient labs and/or xrays were reviewed  Past records were reviewed. PLAN:  .  Orders Placed This Encounter    CT ABD PELV W CONT    HEMOGLOBIN A1C WITH EAG    LIPID PANEL    RENAL FUNCTION PANEL    polyethylene glycol (MIRALAX) 17 gram packet       Follow-up and Dispositions    Return in about 4 months (around 1/1/2023). ATTENTION:   This medical record was transcribed using an electronic medical records system. Although proofread, it may and can contain electronic and spelling errors. Other human spelling and other errors may be present. Corrections may be executed at a later time. Please feel free to contact us for any clarifications as needed.

## 2022-09-01 NOTE — PROGRESS NOTES
Room 2     Identified pt with two pt identifiers(name and ). Reviewed record in preparation for visit and have obtained necessary documentation. All patient medications has been reviewed. Chief Complaint   Patient presents with    Abdominal Pain     Lower abd pain, comes and go, going on for a few weeks. 3 most recent PHQ Screens 2022   Little interest or pleasure in doing things Not at all   Feeling down, depressed, irritable, or hopeless Not at all   Total Score PHQ 2 0     No flowsheet data found. Health Maintenance Due   Topic    Shingrix Vaccine Age 50> (1 of 2)    COVID-19 Vaccine (4 - Booster for Moderna series)    Flu Vaccine (1)         Health Maintenance Review: Patient reminded of \"due or due soon\" health maintenance. I have asked the patient to contact his/her primary care provider (PCP) for follow-up on his/her health maintenance. Vitals:    22 1325 22 1334   BP: (!) 174/103 (!) 157/88   Pulse: 80    Resp: 17    Temp: 98.1 °F (36.7 °C)    TempSrc: Oral    SpO2: 98%    Weight: 246 lb 1.6 oz (111.6 kg)    Height: 5' 9\" (1.753 m)    PainSc:   5    PainLoc: Abdomen        Wt Readings from Last 3 Encounters:   22 246 lb 1.6 oz (111.6 kg)   22 241 lb 3.2 oz (109.4 kg)   22 241 lb 10 oz (109.6 kg)     Temp Readings from Last 3 Encounters:   22 98.1 °F (36.7 °C) (Oral)   22 98 °F (36.7 °C) (Oral)   22 98.2 °F (36.8 °C) (Oral)     BP Readings from Last 3 Encounters:   22 (!) 157/88   22 (!) 154/94   22 (!) 153/92     Pulse Readings from Last 3 Encounters:   22 80   22 96   22 92       Coordination of Care Questionnaire:   1) Have you been to an emergency room, urgent care, or hospitalized since your last visit?   no       2. Have seen or consulted any other health care provider since your last visit?  NO    Patient is accompanied by self I have received verbal consent from Nery Arnold to discuss any/all medical information while they are present in the room.

## 2022-09-02 LAB
ALBUMIN SERPL-MCNC: 4.2 G/DL (ref 3.5–5)
ANION GAP SERPL CALC-SCNC: 7 MMOL/L (ref 5–15)
BUN SERPL-MCNC: 15 MG/DL (ref 6–20)
BUN/CREAT SERPL: 13 (ref 12–20)
CALCIUM SERPL-MCNC: 9.4 MG/DL (ref 8.5–10.1)
CHLORIDE SERPL-SCNC: 102 MMOL/L (ref 97–108)
CHOLEST SERPL-MCNC: 180 MG/DL
CO2 SERPL-SCNC: 27 MMOL/L (ref 21–32)
COMMENT, HOLDF: NORMAL
CREAT SERPL-MCNC: 1.17 MG/DL (ref 0.7–1.3)
EST. AVERAGE GLUCOSE BLD GHB EST-MCNC: 128 MG/DL
GLUCOSE SERPL-MCNC: 112 MG/DL (ref 65–100)
HBA1C MFR BLD: 6.1 % (ref 4–5.6)
HDLC SERPL-MCNC: 67 MG/DL
HDLC SERPL: 2.7 {RATIO} (ref 0–5)
LDLC SERPL CALC-MCNC: 101.2 MG/DL (ref 0–100)
PHOSPHATE SERPL-MCNC: 3 MG/DL (ref 2.6–4.7)
POTASSIUM SERPL-SCNC: 3.8 MMOL/L (ref 3.5–5.1)
SAMPLES BEING HELD,HOLD: NORMAL
SODIUM SERPL-SCNC: 136 MMOL/L (ref 136–145)
TRIGL SERPL-MCNC: 59 MG/DL (ref ?–150)
VLDLC SERPL CALC-MCNC: 11.8 MG/DL

## 2022-10-18 NOTE — PROGRESS NOTES
On further discussion and consideration of his complaint, we will ask for a CT of his abdomen and pelvis to rule out pathology of the lower abdomen.

## 2022-10-18 NOTE — PROGRESS NOTES
Patient returns today with a known history of primary hypertension, obesity, impaired glucose tolerance, dyslipidemia, TIA and is seen for evaluation. Since we last saw him, he has retired now and is only working a couple days a week and gets Social Security. He looked at the finances and said they both work and he was just tired of working. He also has discomfort in his lower abdomen, which he thinks is related to his weight.

## 2023-05-22 RX ORDER — SILDENAFIL 100 MG/1
100 TABLET, FILM COATED ORAL PRN
COMMUNITY
Start: 2019-11-16

## 2023-05-22 RX ORDER — ROSUVASTATIN CALCIUM 10 MG/1
1 TABLET, COATED ORAL NIGHTLY
COMMUNITY
Start: 2022-03-28

## 2023-05-22 RX ORDER — CHLORHEXIDINE GLUCONATE 0.12 MG/ML
RINSE ORAL
COMMUNITY
Start: 2022-03-07

## 2023-05-22 RX ORDER — TELMISARTAN AND HYDROCHLORTHIAZIDE 80; 12.5 MG/1; MG/1
1 TABLET ORAL DAILY
COMMUNITY
Start: 2022-03-28

## 2023-05-22 RX ORDER — AMLODIPINE BESYLATE 10 MG/1
10 TABLET ORAL DAILY
COMMUNITY
Start: 2022-01-06

## 2023-05-22 RX ORDER — METOPROLOL SUCCINATE 25 MG/1
1 TABLET, EXTENDED RELEASE ORAL NIGHTLY
COMMUNITY
Start: 2022-08-29

## 2023-05-22 RX ORDER — ASPIRIN 81 MG/1
TABLET ORAL DAILY
COMMUNITY

## 2023-05-22 RX ORDER — NAPROXEN 500 MG/1
500 TABLET ORAL 2 TIMES DAILY WITH MEALS
COMMUNITY
Start: 2019-10-26

## 2023-05-22 RX ORDER — POLYETHYLENE GLYCOL 3350 17 G/17G
17 POWDER, FOR SOLUTION ORAL 2 TIMES DAILY
COMMUNITY
Start: 2022-09-01

## 2023-10-24 ENCOUNTER — OFFICE VISIT (OUTPATIENT)
Facility: CLINIC | Age: 63
End: 2023-10-24
Payer: COMMERCIAL

## 2023-10-24 VITALS
RESPIRATION RATE: 18 BRPM | SYSTOLIC BLOOD PRESSURE: 175 MMHG | HEIGHT: 69 IN | TEMPERATURE: 97.7 F | DIASTOLIC BLOOD PRESSURE: 107 MMHG | WEIGHT: 259.2 LBS | OXYGEN SATURATION: 99 % | BODY MASS INDEX: 38.39 KG/M2 | HEART RATE: 89 BPM

## 2023-10-24 DIAGNOSIS — E78.5 DYSLIPIDEMIA: ICD-10-CM

## 2023-10-24 DIAGNOSIS — R73.02 IGT (IMPAIRED GLUCOSE TOLERANCE): ICD-10-CM

## 2023-10-24 DIAGNOSIS — Z00.00 PREVENTATIVE HEALTH CARE: Primary | ICD-10-CM

## 2023-10-24 DIAGNOSIS — R35.1 NOCTURIA: ICD-10-CM

## 2023-10-24 DIAGNOSIS — E66.01 SEVERE OBESITY (HCC): ICD-10-CM

## 2023-10-24 DIAGNOSIS — I10 PRIMARY HYPERTENSION: ICD-10-CM

## 2023-10-24 PROCEDURE — 36415 COLL VENOUS BLD VENIPUNCTURE: CPT | Performed by: INTERNAL MEDICINE

## 2023-10-24 PROCEDURE — 3074F SYST BP LT 130 MM HG: CPT | Performed by: INTERNAL MEDICINE

## 2023-10-24 PROCEDURE — 99396 PREV VISIT EST AGE 40-64: CPT | Performed by: INTERNAL MEDICINE

## 2023-10-24 PROCEDURE — 3078F DIAST BP <80 MM HG: CPT | Performed by: INTERNAL MEDICINE

## 2023-10-24 PROCEDURE — G8484 FLU IMMUNIZE NO ADMIN: HCPCS | Performed by: INTERNAL MEDICINE

## 2023-10-24 RX ORDER — ROSUVASTATIN CALCIUM 10 MG/1
10 TABLET, COATED ORAL NIGHTLY
Qty: 90 TABLET | Refills: 11 | Status: SHIPPED | OUTPATIENT
Start: 2023-10-24 | End: 2023-10-25 | Stop reason: DRUGHIGH

## 2023-10-24 RX ORDER — POLYETHYLENE GLYCOL 3350 17 G/17G
17 POWDER, FOR SOLUTION ORAL 2 TIMES DAILY
Qty: 180 EACH | Refills: 11 | Status: SHIPPED | OUTPATIENT
Start: 2023-10-24

## 2023-10-24 RX ORDER — AMLODIPINE BESYLATE 10 MG/1
10 TABLET ORAL DAILY
Qty: 90 TABLET | Refills: 11 | Status: SHIPPED | OUTPATIENT
Start: 2023-10-24

## 2023-10-24 RX ORDER — SILDENAFIL 100 MG/1
100 TABLET, FILM COATED ORAL PRN
Qty: 90 TABLET | Refills: 11 | Status: SHIPPED | OUTPATIENT
Start: 2023-10-24

## 2023-10-24 RX ORDER — TELMISARTAN AND HYDROCHLORTHIAZIDE 80; 12.5 MG/1; MG/1
1 TABLET ORAL DAILY
Qty: 90 TABLET | Refills: 11 | Status: SHIPPED | OUTPATIENT
Start: 2023-10-24

## 2023-10-24 RX ORDER — METOPROLOL SUCCINATE 25 MG/1
25 TABLET, EXTENDED RELEASE ORAL NIGHTLY
Qty: 90 TABLET | Refills: 11 | Status: SHIPPED | OUTPATIENT
Start: 2023-10-24

## 2023-10-24 ASSESSMENT — ANXIETY QUESTIONNAIRES
1. FEELING NERVOUS, ANXIOUS, OR ON EDGE: 0
IF YOU CHECKED OFF ANY PROBLEMS ON THIS QUESTIONNAIRE, HOW DIFFICULT HAVE THESE PROBLEMS MADE IT FOR YOU TO DO YOUR WORK, TAKE CARE OF THINGS AT HOME, OR GET ALONG WITH OTHER PEOPLE: NOT DIFFICULT AT ALL
2. NOT BEING ABLE TO STOP OR CONTROL WORRYING: 0
4. TROUBLE RELAXING: 0
3. WORRYING TOO MUCH ABOUT DIFFERENT THINGS: 0
GAD7 TOTAL SCORE: 0
6. BECOMING EASILY ANNOYED OR IRRITABLE: 0
5. BEING SO RESTLESS THAT IT IS HARD TO SIT STILL: 0
7. FEELING AFRAID AS IF SOMETHING AWFUL MIGHT HAPPEN: 0

## 2023-10-24 ASSESSMENT — PATIENT HEALTH QUESTIONNAIRE - PHQ9
SUM OF ALL RESPONSES TO PHQ QUESTIONS 1-9: 0
SUM OF ALL RESPONSES TO PHQ9 QUESTIONS 1 & 2: 0
SUM OF ALL RESPONSES TO PHQ QUESTIONS 1-9: 0
1. LITTLE INTEREST OR PLEASURE IN DOING THINGS: 0
2. FEELING DOWN, DEPRESSED OR HOPELESS: 0

## 2023-10-24 NOTE — PROGRESS NOTES
Kina Romero is a 57 y.o. male    Chief Complaint   Patient presents with    Follow-up     1. Have you been to the ER, urgent care clinic since your last visit? Hospitalized since your last visit? No    2. Have you seen or consulted any other health care providers outside of the [address] [place] [address] Avenue since your last visit? Include any pap smears or colon screening.  No
Panel     Standing Status:   Future     Number of Occurrences:   1     Standing Expiration Date:   10/24/2024    Comprehensive Metabolic Panel     Standing Status:   Future     Number of Occurrences:   1     Standing Expiration Date:   10/24/2024    CBC with Auto Differential     Standing Status:   Future     Number of Occurrences:   1     Standing Expiration Date:   10/24/2024    RI COLLECTION VENOUS BLOOD VENIPUNCTURE        Follow-up and Dispositions    Return in about 3 months (around 1/24/2024). ATTENTION:   This medical record was transcribed using an electronic medical records system. Although proofread, it may and can contain electronic and spelling errors. Other human spelling and other errors may be present. Corrections may be executed at a later time. Please feel free to contact us for any clarifications as needed.

## 2023-10-25 LAB
ALBUMIN SERPL-MCNC: 4.1 G/DL (ref 3.5–5)
ALBUMIN/GLOB SERPL: 1.1 (ref 1.1–2.2)
ALP SERPL-CCNC: 55 U/L (ref 45–117)
ALT SERPL-CCNC: 28 U/L (ref 12–78)
ANION GAP SERPL CALC-SCNC: 4 MMOL/L (ref 5–15)
APPEARANCE UR: CLEAR
AST SERPL-CCNC: 21 U/L (ref 15–37)
BACTERIA URNS QL MICRO: NEGATIVE /HPF
BASOPHILS # BLD: 0.1 K/UL (ref 0–0.1)
BASOPHILS NFR BLD: 1 % (ref 0–1)
BILIRUB SERPL-MCNC: 0.9 MG/DL (ref 0.2–1)
BILIRUB UR QL: NEGATIVE
BUN SERPL-MCNC: 10 MG/DL (ref 6–20)
BUN/CREAT SERPL: 9 (ref 12–20)
CALCIUM SERPL-MCNC: 9.5 MG/DL (ref 8.5–10.1)
CHLORIDE SERPL-SCNC: 103 MMOL/L (ref 97–108)
CHOLEST SERPL-MCNC: 196 MG/DL
CO2 SERPL-SCNC: 29 MMOL/L (ref 21–32)
COLOR UR: YELLOW
CREAT SERPL-MCNC: 1.12 MG/DL (ref 0.7–1.3)
DIFFERENTIAL METHOD BLD: NORMAL
EOSINOPHIL # BLD: 0.2 K/UL (ref 0–0.4)
EOSINOPHIL NFR BLD: 4 % (ref 0–7)
EPITH CASTS URNS QL MICRO: NORMAL /LPF
ERYTHROCYTE [DISTWIDTH] IN BLOOD BY AUTOMATED COUNT: 14.2 % (ref 11.5–14.5)
EST. AVERAGE GLUCOSE BLD GHB EST-MCNC: 131 MG/DL
GLOBULIN SER CALC-MCNC: 3.6 G/DL (ref 2–4)
GLUCOSE SERPL-MCNC: 101 MG/DL (ref 65–100)
GLUCOSE UR STRIP.AUTO-MCNC: NEGATIVE MG/DL
HBA1C MFR BLD: 6.2 % (ref 4–5.6)
HCT VFR BLD AUTO: 45.3 % (ref 36.6–50.3)
HDLC SERPL-MCNC: 76 MG/DL
HDLC SERPL: 2.6 (ref 0–5)
HGB BLD-MCNC: 15.4 G/DL (ref 12.1–17)
HGB UR QL STRIP: NEGATIVE
HYALINE CASTS URNS QL MICRO: NORMAL /LPF (ref 0–5)
IMM GRANULOCYTES # BLD AUTO: 0 K/UL (ref 0–0.04)
IMM GRANULOCYTES NFR BLD AUTO: 0 % (ref 0–0.5)
KETONES UR QL STRIP.AUTO: NEGATIVE MG/DL
LDLC SERPL CALC-MCNC: 107.8 MG/DL (ref 0–100)
LEUKOCYTE ESTERASE UR QL STRIP.AUTO: NEGATIVE
LYMPHOCYTES # BLD: 2.5 K/UL (ref 0.8–3.5)
LYMPHOCYTES NFR BLD: 39 % (ref 12–49)
MCH RBC QN AUTO: 30.3 PG (ref 26–34)
MCHC RBC AUTO-ENTMCNC: 34 G/DL (ref 30–36.5)
MCV RBC AUTO: 89 FL (ref 80–99)
MONOCYTES # BLD: 0.8 K/UL (ref 0–1)
MONOCYTES NFR BLD: 13 % (ref 5–13)
NEUTS SEG # BLD: 2.8 K/UL (ref 1.8–8)
NEUTS SEG NFR BLD: 43 % (ref 32–75)
NITRITE UR QL STRIP.AUTO: NEGATIVE
NRBC # BLD: 0 K/UL (ref 0–0.01)
NRBC BLD-RTO: 0 PER 100 WBC
PH UR STRIP: 7 (ref 5–8)
PLATELET # BLD AUTO: 230 K/UL (ref 150–400)
PMV BLD AUTO: 9.7 FL (ref 8.9–12.9)
POTASSIUM SERPL-SCNC: 4.1 MMOL/L (ref 3.5–5.1)
PROT SERPL-MCNC: 7.7 G/DL (ref 6.4–8.2)
PROT UR STRIP-MCNC: NEGATIVE MG/DL
PSA SERPL-MCNC: 0.9 NG/ML (ref 0.01–4)
RBC # BLD AUTO: 5.09 M/UL (ref 4.1–5.7)
RBC #/AREA URNS HPF: NORMAL /HPF (ref 0–5)
SODIUM SERPL-SCNC: 136 MMOL/L (ref 136–145)
SP GR UR REFRACTOMETRY: 1.01 (ref 1–1.03)
TRIGL SERPL-MCNC: 61 MG/DL
UROBILINOGEN UR QL STRIP.AUTO: 0.2 EU/DL (ref 0.2–1)
VLDLC SERPL CALC-MCNC: 12.2 MG/DL
WBC # BLD AUTO: 6.3 K/UL (ref 4.1–11.1)
WBC URNS QL MICRO: NORMAL /HPF (ref 0–4)

## 2023-10-25 RX ORDER — ROSUVASTATIN CALCIUM 20 MG/1
20 TABLET, COATED ORAL DAILY
Qty: 90 TABLET | Refills: 3 | Status: SHIPPED | OUTPATIENT
Start: 2023-10-25

## 2024-10-27 RX ORDER — METOPROLOL SUCCINATE 25 MG/1
25 TABLET, EXTENDED RELEASE ORAL NIGHTLY
Qty: 90 TABLET | Refills: 11 | OUTPATIENT
Start: 2024-10-27

## 2024-10-27 RX ORDER — ROSUVASTATIN CALCIUM 20 MG/1
20 TABLET, COATED ORAL DAILY
Qty: 90 TABLET | Refills: 3 | OUTPATIENT
Start: 2024-10-27

## 2024-10-27 RX ORDER — AMLODIPINE BESYLATE 10 MG/1
10 TABLET ORAL DAILY
Qty: 90 TABLET | Refills: 11 | OUTPATIENT
Start: 2024-10-27

## 2024-11-14 RX ORDER — AMLODIPINE BESYLATE 10 MG/1
10 TABLET ORAL DAILY
Qty: 90 TABLET | Refills: 0 | Status: SHIPPED | OUTPATIENT
Start: 2024-11-14

## 2024-11-14 RX ORDER — TELMISARTAN AND HYDROCHLORTHIAZIDE 80; 12.5 MG/1; MG/1
1 TABLET ORAL DAILY
Qty: 90 TABLET | Refills: 0 | Status: SHIPPED | OUTPATIENT
Start: 2024-11-14

## 2024-11-14 RX ORDER — METOPROLOL SUCCINATE 25 MG/1
25 TABLET, EXTENDED RELEASE ORAL NIGHTLY
Qty: 90 TABLET | Refills: 0 | Status: SHIPPED | OUTPATIENT
Start: 2024-11-14

## 2025-03-11 ENCOUNTER — OFFICE VISIT (OUTPATIENT)
Facility: CLINIC | Age: 65
End: 2025-03-11

## 2025-03-11 VITALS
DIASTOLIC BLOOD PRESSURE: 83 MMHG | HEIGHT: 69 IN | RESPIRATION RATE: 18 BRPM | OXYGEN SATURATION: 96 % | SYSTOLIC BLOOD PRESSURE: 138 MMHG | TEMPERATURE: 98.3 F | WEIGHT: 260.6 LBS | HEART RATE: 100 BPM | BODY MASS INDEX: 38.6 KG/M2

## 2025-03-11 DIAGNOSIS — E78.5 DYSLIPIDEMIA: ICD-10-CM

## 2025-03-11 DIAGNOSIS — E66.01 SEVERE OBESITY: ICD-10-CM

## 2025-03-11 DIAGNOSIS — R73.02 IGT (IMPAIRED GLUCOSE TOLERANCE): ICD-10-CM

## 2025-03-11 DIAGNOSIS — Z00.00 WELCOME TO MEDICARE PREVENTIVE VISIT: Primary | ICD-10-CM

## 2025-03-11 DIAGNOSIS — Z12.11 SCREEN FOR COLON CANCER: ICD-10-CM

## 2025-03-11 DIAGNOSIS — R35.1 NOCTURIA: ICD-10-CM

## 2025-03-11 DIAGNOSIS — I10 PRIMARY HYPERTENSION: ICD-10-CM

## 2025-03-11 RX ORDER — METOPROLOL SUCCINATE 25 MG/1
25 TABLET, EXTENDED RELEASE ORAL NIGHTLY
Qty: 90 TABLET | Refills: 3 | Status: SHIPPED | OUTPATIENT
Start: 2025-03-11

## 2025-03-11 RX ORDER — TELMISARTAN AND HYDROCHLORTHIAZIDE 80; 12.5 MG/1; MG/1
1 TABLET ORAL DAILY
Qty: 90 TABLET | Refills: 3 | Status: SHIPPED | OUTPATIENT
Start: 2025-03-11

## 2025-03-11 RX ORDER — ROSUVASTATIN CALCIUM 20 MG/1
20 TABLET, COATED ORAL DAILY
Qty: 90 TABLET | Refills: 3 | Status: SHIPPED | OUTPATIENT
Start: 2025-03-11

## 2025-03-11 RX ORDER — AMLODIPINE BESYLATE 10 MG/1
10 TABLET ORAL DAILY
Qty: 90 TABLET | Refills: 0 | Status: SHIPPED | OUTPATIENT
Start: 2025-03-11 | End: 2025-03-12

## 2025-03-11 SDOH — ECONOMIC STABILITY: FOOD INSECURITY: WITHIN THE PAST 12 MONTHS, YOU WORRIED THAT YOUR FOOD WOULD RUN OUT BEFORE YOU GOT MONEY TO BUY MORE.: NEVER TRUE

## 2025-03-11 SDOH — ECONOMIC STABILITY: FOOD INSECURITY: WITHIN THE PAST 12 MONTHS, THE FOOD YOU BOUGHT JUST DIDN'T LAST AND YOU DIDN'T HAVE MONEY TO GET MORE.: NEVER TRUE

## 2025-03-11 ASSESSMENT — ANXIETY QUESTIONNAIRES
6. BECOMING EASILY ANNOYED OR IRRITABLE: NOT AT ALL
7. FEELING AFRAID AS IF SOMETHING AWFUL MIGHT HAPPEN: NOT AT ALL
4. TROUBLE RELAXING: NOT AT ALL
1. FEELING NERVOUS, ANXIOUS, OR ON EDGE: NOT AT ALL
GAD7 TOTAL SCORE: 0
5. BEING SO RESTLESS THAT IT IS HARD TO SIT STILL: NOT AT ALL
IF YOU CHECKED OFF ANY PROBLEMS ON THIS QUESTIONNAIRE, HOW DIFFICULT HAVE THESE PROBLEMS MADE IT FOR YOU TO DO YOUR WORK, TAKE CARE OF THINGS AT HOME, OR GET ALONG WITH OTHER PEOPLE: NOT DIFFICULT AT ALL
3. WORRYING TOO MUCH ABOUT DIFFERENT THINGS: NOT AT ALL
2. NOT BEING ABLE TO STOP OR CONTROL WORRYING: NOT AT ALL

## 2025-03-11 ASSESSMENT — PATIENT HEALTH QUESTIONNAIRE - PHQ9
SUM OF ALL RESPONSES TO PHQ QUESTIONS 1-9: 0
1. LITTLE INTEREST OR PLEASURE IN DOING THINGS: NOT AT ALL
2. FEELING DOWN, DEPRESSED OR HOPELESS: NOT AT ALL
SUM OF ALL RESPONSES TO PHQ QUESTIONS 1-9: 0

## 2025-03-11 NOTE — PATIENT INSTRUCTIONS
tell you to chew 1 adult-strength or 2 to 4 low-dose aspirin. Wait for an ambulance. Do not try to drive yourself.  Watch closely for changes in your health, and be sure to contact your doctor if you have any problems.  Where can you learn more?  Go to https://www.[employer].net/patientEd and enter F075 to learn more about \"A Healthy Heart: Care Instructions.\"  Current as of: July 31, 2024  Content Version: 14.3  © 2024 [employer].   Care instructions adapted under license by [employer]. If you have questions about a medical condition or this instruction, always ask your healthcare professional. [employer], disclaims any warranty or liability for your use of this information.    Personalized Preventive Plan for Brian Franklin - 3/11/2025  Medicare offers a range of preventive health benefits. Some of the tests and screenings are paid in full while other may be subject to a deductible, co-insurance, and/or copay.  Some of these benefits include a comprehensive review of your medical history including lifestyle, illnesses that may run in your family, and various assessments and screenings as appropriate.  After reviewing your medical record and screening and assessments performed today your provider may have ordered immunizations, labs, imaging, and/or referrals for you.  A list of these orders (if applicable) as well as your Preventive Care list are included within your After Visit Summary for your review.

## 2025-03-11 NOTE — PROGRESS NOTES
Medicare Annual Wellness Visit    Brian Franklin is here for Medication Refill and Medicare AWV    Assessment & Plan   Welcome to Medicare preventive visit       No follow-ups on file.     Subjective       Patient's complete Health Risk Assessment and screening values have been reviewed and are found in Flowsheets. The following problems were reviewed today and where indicated follow up appointments were made and/or referrals ordered.    Positive Risk Factor Screenings with Interventions:                Abnormal BMI (obese):  Body mass index is 38.48 kg/m². (!) Abnormal  Interventions:  See A/P for plan and any pertinent orders                           Objective   Vitals:    03/11/25 1311 03/11/25 1323   BP: (!) 143/79 138/83   BP Site: Left Upper Arm Left Upper Arm   Patient Position: Sitting Sitting   BP Cuff Size: Large Adult Large Adult   Pulse: 100    Resp: 18    Temp: 98.3 °F (36.8 °C)    TempSrc: Oral    SpO2: 96%    Weight: 118.2 kg (260 lb 9.6 oz)    Height: 1.753 m (5' 9\")       Body mass index is 38.48 kg/m².                  No Known Allergies  Prior to Visit Medications    Medication Sig Taking? Authorizing Provider   telmisartan-hydroCHLOROthiazide (MICARDIS HCT) 80-12.5 MG per tablet Take 1 tablet by mouth daily Yes Regulo Dennis MD   metoprolol succinate (TOPROL XL) 25 MG extended release tablet Take 1 tablet by mouth nightly Yes Regulo Dennis MD   amLODIPine (NORVASC) 10 MG tablet Take 1 tablet by mouth daily Yes Regulo Dennis MD   rosuvastatin (CRESTOR) 20 MG tablet Take 1 tablet by mouth daily Yes Regulo Dennis MD   sildenafil (VIAGRA) 100 MG tablet Take 1 tablet by mouth as needed for Erectile Dysfunction Yes Regulo Dennis MD   polyethylene glycol (GLYCOLAX) 17 GM/SCOOP powder Take 17 g by mouth 2 times daily Yes Regulo Dennis MD   aspirin 81 MG EC tablet Take by mouth daily Yes Automatic Reconciliation, Ar   chlorhexidine (PERIDEX) 0.12 %

## 2025-03-11 NOTE — PROGRESS NOTES
SPORTS MEDICINE AND PRIMARY CARE  Regulo Dennis MD, FACP, CMD  [address] [place] [address]  Phone:  [phone]  Fax: [phone]       Chief Complaint   Patient presents with    Medication Refill    Medicare AWV   .      SUBJECTIVE:  History of Present Illness         Brian Franklin is a 64 y.o. male  patient returns today with a known history of hypertension, dyslipidemia, impaired glucose tolerance, and severe obesity, presenting for evaluation.    He has been diligently monitoring his blood pressure, which has consistently registered at 132/85, with a slight elevation to 138 today. He reports that his blood pressure has previously spiked to 145, but he manages it with medication. He is currently on amlodipine and metoprolol.    He acknowledges the need for lifestyle modifications, including increased physical activity and dietary changes. He admits to a habit of eating and then immediately sleeping, which he recognizes as unhealthy.    He has been adhering to his prescribed cholesterol medication regimen. He is on Crestor.    He has completed his shingles vaccination series. He expresses a lack of interest in receiving the influenza vaccine due to past experiences of transient illness following the shot. He is due for a colonoscopy and wishes to have it performed at [facility]. His last ophthalmological examination was conducted in 2023.    Supplemental Information  He reports no current health concerns. He experienced a fall recently but did not seek medical attention as he felt it was unnecessary.    SOCIAL HISTORY  He is employed as a  for , working 3 hours in the morning and 3 hours in the evening.    MEDICATIONS  Current: Amlodipine, metoprolol, Crestor.  Discontinued: Naprosyn, MiraLAX, sildenafil.    IMMUNIZATIONS  He has received the shingles vaccine.       Current Outpatient Medications   Medication Sig Dispense Refill    rosuvastatin (CRESTOR) 20 MG tablet Take 1

## 2025-03-11 NOTE — PROGRESS NOTES
Chief Complaint   Patient presents with    Medication Refill     \"Have you been to the ER, urgent care clinic since your last visit?  Hospitalized since your last visit?\"    NO    “Have you seen or consulted any other health care providers outside our system since your last visit?”    NO      “Have you had a colorectal cancer screening such as a colonoscopy/FIT/Cologuard?    NO    Date of last Colonoscopy: 2/27/2019  No cologuard on file  No FIT/FOBT on file   No flexible sigmoidoscopy on file

## 2025-03-12 RX ORDER — AMLODIPINE BESYLATE 10 MG/1
10 TABLET ORAL DAILY
Qty: 90 TABLET | Refills: 11 | Status: SHIPPED | OUTPATIENT
Start: 2025-03-12